# Patient Record
Sex: FEMALE | Race: WHITE | NOT HISPANIC OR LATINO | Employment: FULL TIME | URBAN - NONMETROPOLITAN AREA
[De-identification: names, ages, dates, MRNs, and addresses within clinical notes are randomized per-mention and may not be internally consistent; named-entity substitution may affect disease eponyms.]

---

## 2017-01-11 ENCOUNTER — TRANSCRIBE ORDERS (OUTPATIENT)
Dept: PHYSICAL THERAPY | Facility: HOSPITAL | Age: 47
End: 2017-01-11

## 2017-01-11 DIAGNOSIS — G89.29 CHRONIC RIGHT SHOULDER PAIN: Primary | ICD-10-CM

## 2017-01-11 DIAGNOSIS — M25.511 CHRONIC RIGHT SHOULDER PAIN: Primary | ICD-10-CM

## 2017-01-23 ENCOUNTER — CONSULT (OUTPATIENT)
Dept: GASTROENTEROLOGY | Facility: CLINIC | Age: 47
End: 2017-01-23

## 2017-01-23 ENCOUNTER — HOSPITAL ENCOUNTER (OUTPATIENT)
Dept: PHYSICAL THERAPY | Facility: HOSPITAL | Age: 47
Setting detail: THERAPIES SERIES
Discharge: HOME | End: 2017-01-23

## 2017-01-23 VITALS
SYSTOLIC BLOOD PRESSURE: 138 MMHG | HEIGHT: 59 IN | BODY MASS INDEX: 39.8 KG/M2 | WEIGHT: 197.4 LBS | OXYGEN SATURATION: 98 % | DIASTOLIC BLOOD PRESSURE: 80 MMHG | HEART RATE: 91 BPM

## 2017-01-23 DIAGNOSIS — R10.13 EPIGASTRIC PAIN: Primary | ICD-10-CM

## 2017-01-23 DIAGNOSIS — Z87.11 HISTORY OF STOMACH ULCERS: ICD-10-CM

## 2017-01-23 DIAGNOSIS — G89.29 CHRONIC RIGHT SHOULDER PAIN: Primary | ICD-10-CM

## 2017-01-23 DIAGNOSIS — M25.511 CHRONIC RIGHT SHOULDER PAIN: Primary | ICD-10-CM

## 2017-01-23 DIAGNOSIS — Z86.19 HISTORY OF HELICOBACTER PYLORI INFECTION: ICD-10-CM

## 2017-01-23 PROCEDURE — 97163 PT EVAL HIGH COMPLEX 45 MIN: CPT | Performed by: PHYSICAL THERAPIST

## 2017-01-23 PROCEDURE — 99244 OFF/OP CNSLTJ NEW/EST MOD 40: CPT | Performed by: PHYSICIAN ASSISTANT

## 2017-01-23 RX ORDER — GABAPENTIN 400 MG/1
400 CAPSULE ORAL 3 TIMES DAILY
COMMUNITY

## 2017-01-23 NOTE — MR AVS SNAPSHOT
Albertina A Bayron   1/23/2017 2:30 PM   Consult    Dept Phone:  [phone]   Encounter #:  [id number]    Provider:  SNOW Montalvo   Department:  [facility] GASTROENTEROLOGY                Your Full Care Plan              Today's Medication Changes          These changes are accurate as of: 1/23/17  3:12 PM.  If you have any questions, ask your nurse or doctor.               Stop taking medication(s)listed here:     guaiFENesin 600 MG 12 hr tablet   Commonly known as:  MUCINEX   Stopped by:  SNOW Montalvo           hydrOXYzine 25 MG capsule   Commonly known as:  VISTARIL   Stopped by:  SNOW Montalvo           lamoTRIgine 25 MG tablet   Commonly known as:  LaMICtal   Stopped by:  SNOW Montalvo           losartan-hydrochlorothiazide 100-25 MG per tablet   Commonly known as:  HYZAAR   Stopped by:  SNOW Montalvo           metFORMIN 500 MG tablet   Commonly known as:  GLUCOPHAGE   Stopped by:  SNOW Montalvo           sertraline 100 MG tablet   Commonly known as:  ZOLOFT   Stopped by:  SNOW Montalvo                      Your Updated Medication List          This list is accurate as of: 1/23/17  3:12 PM.  Always use your most recent med list.                amLODIPine 5 MG tablet   Commonly known as:  NORVASC       gabapentin 400 MG capsule   Commonly known as:  NEURONTIN       pantoprazole 40 MG EC tablet   Commonly known as:  PROTONIX               We Performed the Following     Case request       You Were Diagnosed With        Codes Comments    Epigastric pain    -  Primary ICD-10-CM: R10.13  ICD-9-CM: 789.06     History of stomach ulcers     ICD-10-CM: Z87.19  ICD-9-CM: V12.79     History of Helicobacter pylori infection     ICD-10-CM: Z86.19  ICD-9-CM: V12.09       Instructions     None    Patient Instructions History      Upcoming Appointments     Visit Type Date Time  Department    INITIAL EVALUATION 2017  9:00 AM [facility]    CONSULT 2017  2:30 PM [facility] [place]    TREATMENT 2017  3:00 PM [facility]    TREATMENT 2017  2:00 PM [facility]    TREATMENT 2017  2:00 PM [facility]    TREATMENT 2017  2:00 PM [facility]    TREATMENT 2017  2:00 PM [facility]    TREATMENT 2017  2:00 PM [facility]    TREATMENT 2017  2:00 PM [facility]    RE-EVALUATION 2017  2:00 PM [facility]      [employer] Signup     [facility] [employer] allows you to send messages to your doctor, view your test results, renew your prescriptions, schedule appointments, and more. To sign up, go to [employer] and click on the Sign Up Now link in the New User? box. Enter your [employer] Activation Code exactly as it appears below along with the last four digits of your Social Security Number and your Date of Birth () to complete the sign-up process. If you do not sign up before the expiration date, you must request a new code.    [employer] Activation Code: [email or web address]  Expires: 2017  3:12 PM    If you have questions, you can email [email or web address] or call [phone] to talk to our [employer] staff. Remember, [employer] is NOT to be used for urgent needs. For medical emergencies, dial 911.               Other Info from Your Visit           Your Appointments     2017  3:00 PM EST   Therapy Treatment with Sheron Bradley, [facility] OUTPATIENT PHYSICAL THERAPY ([place])    [address]  [place] [address]   [phone]            2017  2:00 PM EST   Therapy Treatment with Dora Song [facility] [place] OUTPATIENT PHYSICAL THERAPY ([place])    [address]  [place] [address]   [phone]            2017  2:00 PM EST   Therapy Treatment with Dora Song,  "[facility] OUTPATIENT PHYSICAL THERAPY ([place])    [address]  [place] [address]   [phone]            Feb 06, 2017  2:00 PM EST   Therapy Treatment with Dora Song, [facility] OUTPATIENT PHYSICAL THERAPY ([place])    [address]  [place] [address]   [phone]            Feb 09, 2017  2:00 PM EST   Therapy Treatment with Dora Song, [facility] OUTPATIENT PHYSICAL THERAPY ([place])    [address]  [place] [address]   [phone]              Allergies     Erythromycin      Penicillins      Sulfa Antibiotics        Reason for Visit     Abdominal Pain     Nausea           Vital Signs     Blood Pressure Pulse Height Weight Oxygen Saturation Body Mass Index    138/80 91 59\" (149.9 cm) 197 lb 6.4 oz (89.5 kg) 98% 39.87 kg/m2    Smoking Status                   Current Every Day Smoker           Problems and Diagnoses Noted     Abdominal pain, pit of stomach    Personal history of Helicobacter infection    History of stomach ulcers        "

## 2017-01-23 NOTE — PROGRESS NOTES
Chief Complaint   Patient presents with   • Abdominal Pain   • Nausea     Albertina Verma is a 46 y.o. female who presents to the office today at the request of TIMOTEO Rasheed for Abdominal Pain and Nausea.    HPI    The patient reports that she has a history of gastric ulcers and H. Pylori but has not had any trouble since 2007 until recently.  During that year, she also had gallstones in her common bile duct.  Her symptoms of nausea, vomiting, epigastric pain that she characterizes as an aching pain have returned.  Patient denies constipation, diarrhea, hematochezia, and melena.  She has been taking Protonix 40 mg for almost a month and it has helped her symptoms somewhat.  Even with the Protonix, the achy feeling returns in the evenings.  She takes zofran to enable her to eat.  Family history includes her mother dying from a perforated gastric ulcer.  Patient's medical history includes asthma, DM, hypertension, and nausea.  Surgical history includes hysterectomy, bronchoscopy, cholecystectomy, tonsillectomy, dental procedure, and cardiac catheterization.  She admits to smoking .5 PPD and admits past alcohol abuse.  Patient has been sober for 3 months.  She denies use of illicit drugs.      Review of Systems   Constitutional: Positive for chills and fever.   Eyes: Positive for visual disturbance.   Respiratory: Positive for cough, shortness of breath and wheezing.    Cardiovascular: Positive for chest pain.   Gastrointestinal: Positive for abdominal distention, abdominal pain and nausea. Negative for anal bleeding, blood in stool, constipation, diarrhea, rectal pain and vomiting.   Endocrine: Positive for cold intolerance.   Genitourinary: Negative.    Musculoskeletal: Positive for arthralgias, back pain, myalgias and neck pain.   Skin: Negative.    Allergic/Immunologic: Positive for environmental allergies.   Neurological: Positive for dizziness, syncope, light-headedness and headaches.   Hematological:  "Bruises/bleeds easily.   Psychiatric/Behavioral: Positive for sleep disturbance. The patient is nervous/anxious.        ACTIVE PROBLEMS:   Specialty Problems     None          PAST MEDICAL HISTORY:  Past Medical History   Diagnosis Date   • Abdominal pain    • Asthma    • Diabetes mellitus    • Hypertension    • Nausea        SURGICAL HISTORY:  Past Surgical History   Procedure Laterality Date   • Hysterectomy       2006   • Bronchoscopy     • Cholecystectomy     • Tonsillectomy     • Dental procedure     • Cardiac catheterization         FAMILY HISTORY:  Family History   Problem Relation Age of Onset   • Breast cancer Maternal Grandmother        SOCIAL HISTORY:  Social History   Substance Use Topics   • Smoking status: Current Every Day Smoker     Packs/day: 0.50     Types: Cigarettes   • Smokeless tobacco: Not on file   • Alcohol use No       CURRENT MEDICATION:    Current Outpatient Prescriptions:   •  amLODIPine (NORVASC) 5 MG tablet, Take 5 mg by mouth Daily., Disp: , Rfl:   •  gabapentin (NEURONTIN) 400 MG capsule, Take 400 mg by mouth 3 (Three) Times a Day., Disp: , Rfl:   •  pantoprazole (PROTONIX) 40 MG EC tablet, Take 40 mg by mouth Daily., Disp: , Rfl:     ALLERGIES:  Erythromycin; Penicillins; and Sulfa antibiotics    VISIT VITALS:  Visit Vitals   • /80   • Pulse 91   • Ht 59\" (149.9 cm)   • Wt 197 lb 6.4 oz (89.5 kg)   • SpO2 98%   • BMI 39.87 kg/m2       PHYSICAL EXAMINATION:  Physical Exam   Constitutional: She is oriented to person, place, and time. She appears well-developed and well-nourished. No distress.   HENT:   Head: Normocephalic and atraumatic.   Right Ear: External ear normal.   Left Ear: External ear normal.   Nose: Nose normal.   Mouth/Throat: Oropharynx is clear and moist. No oropharyngeal exudate.   Eyes: Conjunctivae and EOM are normal. Pupils are equal, round, and reactive to light. Right eye exhibits no discharge. Left eye exhibits no discharge. No scleral icterus.   Neck: " Normal range of motion. Neck supple. No JVD present. No tracheal deviation present. No thyromegaly present.   Cardiovascular: Normal rate, regular rhythm, normal heart sounds and intact distal pulses.  Exam reveals no gallop and no friction rub.    No murmur heard.  Pulmonary/Chest: Effort normal and breath sounds normal. No stridor. No respiratory distress. She has no wheezes. She has no rales. She exhibits no tenderness.   Abdominal: Soft. Bowel sounds are normal. She exhibits distension. She exhibits no mass. There is no tenderness. There is no rebound and no guarding. No hernia.   Musculoskeletal: She exhibits no edema, tenderness or deformity.   Lymphadenopathy:     She has no cervical adenopathy.   Neurological: She is alert and oriented to person, place, and time. She has normal reflexes. She displays normal reflexes. No cranial nerve deficit. She exhibits normal muscle tone. Coordination normal.   Skin: Skin is warm and dry. No rash noted. She is not diaphoretic. No erythema. No pallor.   Psychiatric: She has a normal mood and affect. Her behavior is normal. Judgment and thought content normal.       Assessment/Plan      Diagnosis Plan   1. Epigastric pain  Case request   2. History of stomach ulcers  Case request   3. History of Helicobacter pylori infection  Case request     It is recommended that the patient have an EGD.  She voiced understanding and agreement.  Return in about 4 weeks (around 2/20/2017) for Recheck.           SNOW Montalvo

## 2017-01-23 NOTE — PROGRESS NOTES
Outpatient Physical Therapy Ortho Initial Evaluation   [place]     Patient Name: Albertina Verma  : 1970  MRN: [id number]  Today's Date: 2017      Visit Date: 2017    There is no problem list on file for this patient.       Past Medical History   Diagnosis Date   • Asthma    • Diabetes mellitus    • Hypertension         Past Surgical History   Procedure Laterality Date   • Hysterectomy       2006   • Bronchoscopy     • Cholecystectomy     • Tonsillectomy     • Dental procedure         Visit Dx:     ICD-10-CM ICD-9-CM   1. Chronic right shoulder pain M25.511 719.41    G89.29 338.29             Patient History       17 0800          History    Chief Complaint Pain  -AT      Type of Pain Shoulder pain;Neck pain  -AT      Date Current Problem(s) Began 16  -AT      Brief Description of Current Complaint Pt states she was admitted to hospital for pneumonia on .  She states she was in hospital for  4 days and states that when she went back home she noticed that her neck and shoulder pain were hurting.  She feels like she pulled something from possibly coughing however it has not gone away.  She is unsure of any particular injury. 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  Pt  was referred to orthopedic surgeon who stated she no longer needed to continue PT services and  gave her steroid injections in shoulder.  She also had a MRI of cervical spine that revealed C5-6 disc herniation.  She states she continues to experience pain and burning in neck, head, right shoulder and right ring finger burns frequently throughout the day.  She is now being referred back to physical therapy by her family dr and states she returns to Dr. Dubin after today's appointment for follow up.    -AT      Patient/Caregiver Goals Relieve pain;Return to prior level of function  -AT      Current Tobacco Use yes  -AT      Smoking Status   pack per day for 27 years  -AT      Patient's Rating of General Health Good  -AT      Occupation/sports/leisure activities  at [employer]  -AT      What clinical tests have you had for this problem? MRI  -AT      Results of Clinical Tests per pt: c-spine was straight and not curved, and C5-6 disc bulge ---no testing at this time on shoulder has been performed.  -AT      Are you or can you be pregnant No  -AT      Pain     Pain Location Neck;Shoulder  -AT      Pain at Present 8  -AT      Pain at Best 2  -AT      Pain at Worst 10  -AT      Pain Frequency Constant/continuous  -AT      Pain Description Aching;Burning;Tingling  -AT      What Performance Factors Make the Current Problem(s) WORSE? pt states work exacerbates it however she did not work this weekend and she continues to experience burning in ring finger.  She states she does not know what causes pain at this time because it hurts with each activity she does prolonged and fluctuates in intensity.   -AT      What Performance Factors Make the Current Problem(s) BETTER? rest and medication, laying down.  -AT      Is your sleep disturbed? Yes  -AT      Total hours of sleep per night 4-5  -AT      Difficulties at work? sitting, keying, repetitive use of right arm  -AT      Difficulties with ADL's? Pt states she is independent with dressing and bathing however states she has difficulty fastening a bra.  She states her son has to assist with carrying laundry up the stairs .  -AT      Daily Activities    Primary Language English  -AT      Are you able to read Yes  -AT      Are you able to write Yes  -AT      How does patient learn best? Listening  -AT      Teaching needs identified Home Exercise Program  -AT      Pt Participated in POC and Goals Yes  -AT      Safety    Are you being hurt, hit, or frightened by anyone at home or in your life? No  -AT      Are you being neglected by a caregiver No  -AT        User Key  (r) = Recorded By, (t) = Taken By, (c) =  Cosigned By    Initials Name Provider Type    AT Felicita Billy PT Physical Therapist                PT Ortho       01/23/17 0900    Posture/Observations    Forward Head Moderate  -AT    Cervical Lordosis Decreased  -AT    Rounded Shoulders Moderate  -AT    Sensation    Sensation WNL? WNL   however burning right ring finert (tip)  -AT    Myotomal Screen- Upper Quarter Clearing    Shoulder flexion (C5) Right:;4- (Good -);Left:;4 (Good)  -AT    Elbow flexion/wrist extension (C6) Right:;4 (Good);Left:;4+ (Good +)  -AT    Elbow extension/wrist flexion (C7) Right:;4 (Good);Left:;4+ (Good +)  -AT    Cervical Palpation    Scalenes Tender  -AT    Spinous Process Tender  -AT    Levator Scapula Tender  -AT    Upper Traps Right:;Tender  -AT    Cervical/Thoracic Special Tests    Spurlings (Foraminal Compression) Positive  -AT    Cervical Compression (Forarminal Compression vs. Facet Pain) Negative  -AT    Cervical Distraction (Foraminal Compression vs. Facet Pain) Positive   relieves pain with distraction  -AT    Shoulder Girdle Palpation    Supraspinatus Insertion Right:;Tender  -AT    Upper Trap Right:;Tender  -AT    Shoulder Impingement/Rotator Cuff Special Tests    Chavez-Obed Test (RC Lesion vs. Bursitis) --   increases pain  -AT    Full Can Test (RC Lesion) Negative  -AT    Empty Can Test (RC Lesion) --   increases pain with resistance   -AT    Drop Arm Test (Full Thickness RC Lesion) Negative  -AT    Speed's Test (LH of Biceps Lesion) --   increases pain with resistance  -AT    Biceps/Labral Special Tests    Milford's Test (Labral Test) --   increases pain with resistance  -AT    Resisted ER and Supination Negative  -AT    Neck    Flexion AROM Deficit 50  -AT    Extension AROM Deficit 50  -AT    Lt Lat Flexion AROM Deficit 50  -AT    Rt Lateral Flexion AROM Deficit 45  -AT    Lt Rotation AROM Deficit 80  -AT    Rt Rotation AROM Deficit 40  -AT    Left Shoulder    Flexion AROM Deficit WFL  -AT    ABduction  AROM Deficit WFL  -AT    Right Shoulder    Flexion AROM Deficit 150  -AT    ABduction AROM Deficit 130  -AT      User Key  (r) = Recorded By, (t) = Taken By, (c) = Cosigned By    Initials Name Provider Type    AT Felicita Billy PT Physical Therapist                            Therapy Education       01/23/17 0950    Therapy Education    Given HEP  -AT    Program New  -AT    How Provided Verbal  -AT      User Key  (r) = Recorded By, (t) = Taken By, (c) = Cosigned By    Initials Name Provider Type    AT Felicita Billy PT Physical Therapist                PT OP Goals       01/23/17 0900          PT Short Term Goals    STG 1 Pt will be instructed in a HEP for shoulder ROM and stability.  -AT      STG 2 Pt will improve shoulder ROM by 5 degrees in all planes to ease ADLs and work activities.   -AT      STG 3 Pt will report a decrease in burning in right UE by 50%.  -AT      Long Term Goals    LTG 1 Pt will be independent with HEP for scapular/shoulder strength and ROm.   -AT      LTG 2 Pt will demonstrate normal right shoulder ROM to ease work and home activities.   -AT      LTG 3 Pt will report being able to perform work day with controlled pain.   -AT      LTG 4 Pt will report an improvement in Quick Dash Score by 10 points to reveal an improvement in daily activities.    -AT      Time Calculation    PT Goal Re-Cert Due Date 02/22/17  -AT        User Key  (r) = Recorded By, (t) = Taken By, (c) = Cosigned By    Initials Name Provider Type    AT Felicita Billy, PT Physical Therapist                PT Assessment/Plan       01/23/17 0951          PT Assessment    Functional Limitations Performance in self-care ADL;Performance in work activities;Limitations in community activities  -AT      Impairments Range of motion;Posture;Poor body mechanics;Pain;Muscle strength  -AT      Assessment Comments Pt is a 45 year old female referred for chronic shoulder pain.  She states she began to have shoulder  and neck pain after pneumonia in September.  She has been referred to orthopedic who performed injections however she reports continued pain.  She had an MRI that revealed C5-6 disc bulge however no testing has been performed at this time on her shoulder.  She presents with decreased shoulder ROM, strength, ADLs, pain, burning right ring finger, and decreased work activities.  Pt will benefit from skilled PT services to address limitations and reach max functional level.    -AT      Rehab Potential Good  -AT      Patient/caregiver participated in establishment of treatment plan and goals Yes  -AT      Patient would benefit from skilled therapy intervention Yes  -AT      PT Plan    PT Frequency 2x/week;3x/week  -AT      Predicted Duration of Therapy Intervention (days/wks) 4 weeks  -AT      Planned CPT's? PT EVAL: 85262;PT THER PROC EA 15 MIN: 36634;PT THER ACT EA 15 MIN: 57517;PT MANUAL THERAPY EA 15 MIN: 96825;PT NEUROMUSC RE-EDUCATION EA 15 MIN: 69010;PT ELECTRICAL STIM UNATTEND: ;PT ULTRASOUND EA 15 MIN: 65409;PT HOT/COLD PACK WC NONMCARE: 53024  -AT      Physical Therapy Interventions (Optional Details) neuromuscular re-education;stretching;strengthening;modalities;manual therapy techniques;ROM (Range of Motion);postural re-education;patient/family education;home exercise program;joint mobilization  -AT      PT Plan Comments Pt will benefit from skilled PT services to address limitations and reach max functional level.    -AT        User Key  (r) = Recorded By, (t) = Taken By, (c) = Cosigned By    Initials Name Provider Type    AT Felicita Billy, PT Physical Therapist                                    Outcome Measures       01/23/17 0900          Quick DASH    Open a tight or new jar. 3  -AT      Do heavy household chores (e.g., wash walls, wash floors) 3  -AT      Carry a shopping bag or briefcase 2  -AT      Wash your back 2  -AT      Use a knife to cut food 1  -AT      Recreational activities  in which you take some force or impact through your arm, should or hand (e.g. golf, hammering, tennis, etc.) 4  -AT      During the past week, to what extent has your arm, shoulder, or hand problem interfered with your normal social activites with family, friends, neighbors or groups? 3  -AT      During the past week, were you limited in your work or other regular daily activities as a result of your arm, shoulder or hand problem? 4  -AT      Arm, Shoulder, or hand pain 4  -AT      Tingling (pins and needles) in your arm, shoulder, or hand 4  -AT      During the past week, how much difficulty have you had sleeping because of the pain in your arm, shoulder or hand? 5  -AT      Number of Questions Answered 11  -AT      Quick DASH Score 54.55  -AT      Functional Assessment    Outcome Measure Options Quick DASH  -AT        User Key  (r) = Recorded By, (t) = Taken By, (c) = Cosigned By    Initials Name Provider Type    AT Felicita Billy PT Physical Therapist            Time Calculation:   Start Time: 0830  Stop Time: 0930  Time Calculation (min): 60 min     Therapy Charges for Today     Code Description Service Date Service Provider Modifiers Qty    [id number] HC PT EVAL HIGH COMPLEXITY 4 1/23/2017 Felicita Billy, PT GP 1          PT G-Codes  Outcome Measure Options: Quick DASH         Felicita Billy PT  1/23/2017

## 2017-01-26 ENCOUNTER — HOSPITAL ENCOUNTER (OUTPATIENT)
Dept: PHYSICAL THERAPY | Facility: HOSPITAL | Age: 47
Setting detail: THERAPIES SERIES
Discharge: HOME | End: 2017-01-26

## 2017-01-26 DIAGNOSIS — G89.29 CHRONIC RIGHT SHOULDER PAIN: Primary | ICD-10-CM

## 2017-01-26 DIAGNOSIS — M25.511 CHRONIC RIGHT SHOULDER PAIN: Primary | ICD-10-CM

## 2017-01-26 PROCEDURE — G0283 ELEC STIM OTHER THAN WOUND: HCPCS

## 2017-01-26 PROCEDURE — 97035 APP MDLTY 1+ULTRASOUND EA 15: CPT

## 2017-01-26 PROCEDURE — 97110 THERAPEUTIC EXERCISES: CPT

## 2017-01-26 NOTE — PROGRESS NOTES
Outpatient Physical Therapy Ortho Treatment Note   Pete     Patient Name: Albertina Verma  : 1970  MRN: [id number]  Today's Date: 2017      Visit Date: 2017    Visit Dx:    ICD-10-CM ICD-9-CM   1. Chronic right shoulder pain M25.511 719.41    G89.29 338.29       Patient Active Problem List   Diagnosis   • Epigastric pain   • History of stomach ulcers   • History of Helicobacter pylori infection        Past Medical History   Diagnosis Date   • Abdominal pain    • Asthma    • Diabetes mellitus    • Hypertension    • Nausea         Past Surgical History   Procedure Laterality Date   • Hysterectomy          • Bronchoscopy     • Cholecystectomy     • Tonsillectomy     • Dental procedure     • Cardiac catheterization               PT Ortho       17 1600    Subjective Comments    Subjective Comments Patient states that she has tingling and burning that runs down his R arm. Patient reports that the burning runs down into her R ring finger.   -    Subjective Pain    Able to rate subjective pain? yes  -    Pre-Treatment Pain Level 7  -    Post-Treatment Pain Level 6  -      User Key  (r) = Recorded By, (t) = Taken By, (c) = Cosigned By    Initials Name Provider Type    SUMAYA Bradley PTA Physical Therapy Assistant                            PT Assessment/Plan       17 1617 17 0951       PT Assessment    Functional Limitations  Performance in self-care ADL;Performance in work activities;Limitations in community activities  -AT     Impairments  Range of motion;Posture;Poor body mechanics;Pain;Muscle strength  -AT     Assessment Comments Patient tolerated treatment well with no increase in pain noted during treatment, patient did have burning and tingling sensation run down her R arm and into her R ring finger. New ther ex added per the patient's tolerance with no increase in pain noted. Educated patient to perform ther ex per her tolerance, patient verbalized  understanding. No adverse reactions with modalities or treatment. Decrease in pain noted following treatment.   - Pt is a 45 year old female referred for chronic shoulder pain.  She states she began to have shoulder and neck pain after pneumonia in September.  She has been referred to orthopedic who performed injections however she reports continued pain.  She had an MRI that revealed C5-6 disc bulge however no testing has been performed at this time on her shoulder.  She presents with decreased shoulder ROM, strength, ADLs, pain, burning right ring finger, and decreased work activities.  Pt will benefit from skilled PT services to address limitations and reach max functional level.    -AT     Rehab Potential  Good  -AT     Patient/caregiver participated in establishment of treatment plan and goals  Yes  -AT     Patient would benefit from skilled therapy intervention  Yes  -AT     PT Plan    PT Frequency  2x/week;3x/week  -AT     Predicted Duration of Therapy Intervention (days/wks)  4 weeks  -AT     Planned CPT's?  PT EVAL: 16568;PT THER PROC EA 15 MIN: 95468;PT THER ACT EA 15 MIN: 71196;PT MANUAL THERAPY EA 15 MIN: 12389;PT NEUROMUSC RE-EDUCATION EA 15 MIN: 89833;PT ELECTRICAL STIM UNATTEND: ;PT ULTRASOUND EA 15 MIN: 83925;PT HOT/COLD PACK WC NONMCARE: 65218  -AT     Physical Therapy Interventions (Optional Details)  neuromuscular re-education;stretching;strengthening;modalities;manual therapy techniques;ROM (Range of Motion);postural re-education;patient/family education;home exercise program;joint mobilization  -AT     PT Plan Comments Continue per PT's POC, progress per the patient's tolerance  - Pt will benefit from skilled PT services to address limitations and reach max functional level.    -AT       User Key  (r) = Recorded By, (t) = Taken By, (c) = Cosigned By    Initials Name Provider Type    AT Felicita Billy, PT Physical Therapist     Sheron Bradley, PTA Physical Therapy  Assistant                Modalities       01/26/17 1600          Moist Heat    MH Applied Yes   No redness noted following moist heat  -AH      Location R shoulder/UT  -AH      Rx Minutes 10 mins  -      MH Prior to Rx Yes  -      ELECTRICAL STIMULATION    Attended/Unattended Unattended   No irritation noted following estim  -      Stimulation Type IFC  -AH      Max mAmp --   per the patient's tolerance  -      Location/Electrode Placement/Other R shoulder/UT  -AH      Rx Minutes 10 mins  -      Ultrasound 35631    Location R UT musculature  -AH      Rx Minutes 8 minutes  -      Duty Cycle 50  -      Frequency --   3.3MHz  -      Intensity - Wts/cm 1.2  -        User Key  (r) = Recorded By, (t) = Taken By, (c) = Cosigned By    Initials Name Provider Type    SUMAYA Bradley PTA Physical Therapy Assistant                Exercises       01/26/17 1600          Subjective Comments    Subjective Comments Patient states that she has tingling and burning that runs down his R arm. Patient reports that the burning runs down into her R ring finger.   -      Subjective Pain    Able to rate subjective pain? yes  -      Pre-Treatment Pain Level 7  -      Post-Treatment Pain Level 6  -      Exercise 1    Exercise Name 1 table slides R) (flex, scap) 10x2, pulleys (flex) 2 min, UT stretch 20 sec hold x3, levator scap stretch 20 sec hold x3, CT stretch 20 sec hold x3, scap retraction 10x2  -      Cueing 1 Verbal;Tactile;Demo  -      Time (Minutes) 1 30 minutes  -      Treatment Type 1 Ther Ex  -        User Key  (r) = Recorded By, (t) = Taken By, (c) = Cosigned By    Initials Name Provider Type    SUMAYA Bradley PTA Physical Therapy Assistant                               PT OP Goals       01/23/17 0900          PT Short Term Goals    STG 1 Pt will be instructed in a HEP for shoulder ROM and stability.  -AT      STG 2 Pt will improve shoulder ROM by 5 degrees in all planes to ease  ADLs and work activities.   -AT      STG 3 Pt will report a decrease in burning in right UE by 50%.  -AT      Long Term Goals    LTG 1 Pt will be independent with HEP for scapular/shoulder strength and ROm.   -AT      LTG 2 Pt will demonstrate normal right shoulder ROM to ease work and home activities.   -AT      LTG 3 Pt will report being able to perform work day with controlled pain.   -AT      LTG 4 Pt will report an improvement in Quick Dash Score by 10 points to reveal an improvement in daily activities.    -AT      Time Calculation    PT Goal Re-Cert Due Date 02/22/17  -AT        User Key  (r) = Recorded By, (t) = Taken By, (c) = Cosigned By    Initials Name Provider Type    AT Felicita Billy PT Physical Therapist                Therapy Education       01/26/17 1617    Therapy Education    Given HEP  -AH    Program New  -    How Provided Verbal  -AH    Provided to Patient  -AH    Level of Understanding Verbalized;Demonstrated  -AH      01/23/17 0950    Therapy Education    Given HEP  -AT    Program New  -    How Provided Verbal  -AT      User Key  (r) = Recorded By, (t) = Taken By, (c) = Cosigned By    Initials Name Provider Type    AT Felicita Billy, PT Physical Therapist     Sheron Bradley, MACIEJ Physical Therapy Assistant                Time Calculation:   Start Time: 1500  Stop Time: 1600  Time Calculation (min): 60 min    Therapy Charges for Today     Code Description Service Date Service Provider Modifiers Qty    30194290175 HC PT THER PROC EA 15 MIN 1/26/2017 Sheron Bradley, MACIEJ GP 2    99890151047 HC ELECTRICAL STIM UNATTENDED 1/26/2017 Sheron Bradley PTA  1    97268313092 HC PT ULTRASOUND EA 15 MIN 1/26/2017 Sheron Bradley, PTA GP 1    43258748139 HC PT THER SUPP EA 15 MIN 1/26/2017 Sheron Bradley, MACIEJ GP 1                    Sheron Bradley PTA  1/26/2017

## 2017-02-02 ENCOUNTER — HOSPITAL ENCOUNTER (OUTPATIENT)
Dept: PHYSICAL THERAPY | Facility: HOSPITAL | Age: 47
Setting detail: THERAPIES SERIES
Discharge: HOME | End: 2017-02-02

## 2017-02-02 DIAGNOSIS — M25.511 CHRONIC RIGHT SHOULDER PAIN: Primary | ICD-10-CM

## 2017-02-02 DIAGNOSIS — G89.29 CHRONIC RIGHT SHOULDER PAIN: Primary | ICD-10-CM

## 2017-02-02 PROCEDURE — 97035 APP MDLTY 1+ULTRASOUND EA 15: CPT

## 2017-02-02 PROCEDURE — 97110 THERAPEUTIC EXERCISES: CPT

## 2017-02-02 PROCEDURE — G0283 ELEC STIM OTHER THAN WOUND: HCPCS

## 2017-02-02 NOTE — PROGRESS NOTES
Outpatient Physical Therapy Ortho Treatment Note   Pete     Patient Name: Albertina Verma  : 1970  MRN: [id number]  Today's Date: 2017      Visit Date: 2017    Visit Dx:    ICD-10-CM ICD-9-CM   1. Chronic right shoulder pain M25.511 719.41    G89.29 338.29       Patient Active Problem List   Diagnosis   • Epigastric pain   • History of stomach ulcers   • History of Helicobacter pylori infection        Past Medical History   Diagnosis Date   • Abdominal pain    • Asthma    • Diabetes mellitus    • Hypertension    • Nausea         Past Surgical History   Procedure Laterality Date   • Hysterectomy          • Bronchoscopy     • Cholecystectomy     • Tonsillectomy     • Dental procedure     • Cardiac catheterization               PT Ortho       17 1600    Subjective Comments    Subjective Comments Patient presents to therapy with reports of increased pain, 8/10 pre tx.  Patient states she has been unable to take her ibuprofen which she believes was helping w/ her pain.  Pt also reports increased soreness following previous session. Pt states she has currently been put off work by referring MD.   -SHAYLEE    Subjective Pain    Able to rate subjective pain? yes  -SHAYLEE    Pre-Treatment Pain Level 8  -SHAYLEE    Post-Treatment Pain Level 5  -SHAYLEE      User Key  (r) = Recorded By, (t) = Taken By, (c) = Cosigned By    Initials Name Provider Type    SHAYLEE Song PTA Physical Therapy Assistant                            PT Assessment/Plan       17 1653          PT Assessment    Assessment Comments Patient presents to therapy w/ reports of increased R) shoulder/ neck pain.  Pt received modalities to assist w/ pain control f/b conservative therex for improved range of motion to involved areas and postural awareness, as to pt's tolerance.  Pt educated to monitor skin following application of biofreeze w/ pt verbalizing understanding.  No skin irritation or adverse reactions observed following  "modalities.  Discussed pt's symptoms with supervising PT, Felicita Billy prior to initiation of tx.  PT advised PTA on today's tx.  Decreased pain noted following session, 5/10.  -SHAYLEE      PT Plan    PT Plan Comments Continue with PT's POC and progress tx as tolerated by patient.  Will continue to monitor pt's symptoms.   -SHAYLEE        User Key  (r) = Recorded By, (t) = Taken By, (c) = Cosigned By    Initials Name Provider Type    SHAYLEE Song PTA Physical Therapy Assistant                Modalities       02/02/17 1600          Moist Heat    MH Applied Yes   applied by Sheron Bradley PTA  -SHAYLEE      Location R shoulder/UT  -SHAYLEE      Rx Minutes 15 mins  -SHAYLEE      MH Prior to Rx Yes  -SHAYLEE      ELECTRICAL STIMULATION    Attended/Unattended Unattended  -SHAYLEE      Stimulation Type IFC  -SHAYLEE      Max mAmp --   as to pt's tolerance  -SHAYLEE      Location/Electrode Placement/Other R shoulder/UT  -SHAYLEE      Rx Minutes 15 mins  -SHAYLEE      Ultrasound 48216    Location R) UT musculature  -SHAYLEE      Rx Minutes 8 minutes  -SHAYLEE      Duty Cycle 100  -SHAYLEE      Frequency --   3.3MHz  -SHAYLEE      Intensity - Wts/cm 1.2  -SHAYLEE        User Key  (r) = Recorded By, (t) = Taken By, (c) = Cosigned By    Initials Name Provider Type    SHAYLEE Song PTA Physical Therapy Assistant                Exercises       02/02/17 1600          Subjective Comments    Subjective Comments Patient presents to therapy with reports of increased pain, 8/10 pre tx.  Patient states she has been unable to take her ibuprofen which she believes was helping w/ her pain.  Pt also reports increased soreness following previous session. Pt states she has currently been put off work by referring MD.   -SHAYLEE      Subjective Pain    Able to rate subjective pain? yes  -SHAYLEE      Pre-Treatment Pain Level 8  -SHAYLEE      Post-Treatment Pain Level 5  -SHAYLEE      Exercise 1    Exercise Name 1 table slides (flex) 10x2, shoulder pulleys (flex) 2 min, UT stretch 3x20\", Lev scap stretch 3x20\", " "C-T stretch 3x20\", scap squeeze 10x2, chin tuck (supine) x10  -SHAYLEE      Cueing 1 Verbal;Tactile;Demo  -SHAYLEE      Time (Minutes) 1 30 min  -SHAYLEE      Treatment Type 1 Ther Ex  -SHAYLEE        User Key  (r) = Recorded By, (t) = Taken By, (c) = Cosigned By    Initials Name Provider Type    SHAYLEE Song PTA Physical Therapy Assistant                               PT OP Goals       01/23/17 0900          PT Short Term Goals    STG 1 Pt will be instructed in a HEP for shoulder ROM and stability.  -AT      STG 2 Pt will improve shoulder ROM by 5 degrees in all planes to ease ADLs and work activities.   -AT      STG 3 Pt will report a decrease in burning in right UE by 50%.  -AT      Long Term Goals    LTG 1 Pt will be independent with HEP for scapular/shoulder strength and ROm.   -AT      LTG 2 Pt will demonstrate normal right shoulder ROM to ease work and home activities.   -AT      LTG 3 Pt will report being able to perform work day with controlled pain.   -AT      LTG 4 Pt will report an improvement in Quick Dash Score by 10 points to reveal an improvement in daily activities.    -AT      Time Calculation    PT Goal Re-Cert Due Date 02/22/17  -AT        User Key  (r) = Recorded By, (t) = Taken By, (c) = Cosigned By    Initials Name Provider Type    AT Felicita Billy, PT Physical Therapist                Therapy Education       02/02/17 1653    Therapy Education    Given HEP;Pain management;Posture/body mechanics  -SHAYLEE    Program Reinforced  -SHAYLEE    How Provided Verbal;Demonstration  -SHAYLEE    Provided to Patient  -SHAYLEE    Level of Understanding Verbalized;Demonstrated  -SHAYLEE      User Key  (r) = Recorded By, (t) = Taken By, (c) = Cosigned By    Initials Name Provider Type    SHAYLEE Song PTA Physical Therapy Assistant                Time Calculation:   Start Time: 1350  Stop Time: 1448  Time Calculation (min): 58 min    Therapy Charges for Today     Code Description Service Date Service Provider " Modifiers Qty    47161414192 HC ELECTRICAL STIM UNATTENDED 2/2/2017 Dora Song, PTA  1    31417215721 HC PT THER PROC EA 15 MIN 2/2/2017 Dora Song, PTA GP 2    38094450953 HC PT ULTRASOUND EA 15 MIN 2/2/2017 Dora Song, PTA GP 1    92894671138 HC PT THER SUPP EA 15 MIN 2/2/2017 Dora Song, MACIEJ GP 1                    Dora Wayne. Duncan, MACIEJ  2/2/2017

## 2017-02-03 RX ORDER — IBUPROFEN 800 MG/1
800 TABLET ORAL EVERY 6 HOURS PRN
COMMUNITY

## 2017-02-03 RX ORDER — CYCLOBENZAPRINE HCL 10 MG
10 TABLET ORAL AS NEEDED
COMMUNITY

## 2017-02-06 ENCOUNTER — HOSPITAL ENCOUNTER (OUTPATIENT)
Facility: HOSPITAL | Age: 47
Setting detail: HOSPITAL OUTPATIENT SURGERY
Discharge: HOME | End: 2017-02-06
Attending: INTERNAL MEDICINE | Admitting: INTERNAL MEDICINE

## 2017-02-06 ENCOUNTER — ANESTHESIA (OUTPATIENT)
Dept: PERIOP | Facility: HOSPITAL | Age: 47
End: 2017-02-06

## 2017-02-06 ENCOUNTER — ANESTHESIA EVENT (OUTPATIENT)
Dept: PERIOP | Facility: HOSPITAL | Age: 47
End: 2017-02-06

## 2017-02-06 VITALS
DIASTOLIC BLOOD PRESSURE: 67 MMHG | RESPIRATION RATE: 20 BRPM | SYSTOLIC BLOOD PRESSURE: 106 MMHG | HEIGHT: 59 IN | OXYGEN SATURATION: 95 % | HEART RATE: 66 BPM | WEIGHT: 195 LBS | BODY MASS INDEX: 39.31 KG/M2 | TEMPERATURE: 97.4 F

## 2017-02-06 DIAGNOSIS — R10.13 EPIGASTRIC PAIN: ICD-10-CM

## 2017-02-06 DIAGNOSIS — Z86.19 HISTORY OF HELICOBACTER PYLORI INFECTION: ICD-10-CM

## 2017-02-06 DIAGNOSIS — Z87.11 HISTORY OF STOMACH ULCERS: ICD-10-CM

## 2017-02-06 PROCEDURE — 25010000002 FENTANYL CITRATE (PF) 100 MCG/2ML SOLUTION: Performed by: NURSE ANESTHETIST, CERTIFIED REGISTERED

## 2017-02-06 PROCEDURE — 43239 EGD BIOPSY SINGLE/MULTIPLE: CPT | Performed by: INTERNAL MEDICINE

## 2017-02-06 PROCEDURE — 25010000002 PROPOFOL 10 MG/ML EMULSION: Performed by: NURSE ANESTHETIST, CERTIFIED REGISTERED

## 2017-02-06 RX ORDER — FENTANYL CITRATE 50 UG/ML
50 INJECTION, SOLUTION INTRAMUSCULAR; INTRAVENOUS
Status: DISCONTINUED | OUTPATIENT
Start: 2017-02-06 | End: 2017-02-06 | Stop reason: HOSPADM

## 2017-02-06 RX ORDER — LIDOCAINE HYDROCHLORIDE 10 MG/ML
INJECTION, SOLUTION INFILTRATION; PERINEURAL AS NEEDED
Status: DISCONTINUED | OUTPATIENT
Start: 2017-02-06 | End: 2017-02-06 | Stop reason: SURG

## 2017-02-06 RX ORDER — IPRATROPIUM BROMIDE AND ALBUTEROL SULFATE 2.5; .5 MG/3ML; MG/3ML
3 SOLUTION RESPIRATORY (INHALATION) ONCE AS NEEDED
Status: DISCONTINUED | OUTPATIENT
Start: 2017-02-06 | End: 2017-02-06 | Stop reason: HOSPADM

## 2017-02-06 RX ORDER — SODIUM CHLORIDE 0.9 % (FLUSH) 0.9 %
1-10 SYRINGE (ML) INJECTION AS NEEDED
Status: DISCONTINUED | OUTPATIENT
Start: 2017-02-06 | End: 2017-02-06 | Stop reason: HOSPADM

## 2017-02-06 RX ORDER — PROPOFOL 10 MG/ML
VIAL (ML) INTRAVENOUS CONTINUOUS PRN
Status: DISCONTINUED | OUTPATIENT
Start: 2017-02-06 | End: 2017-02-06 | Stop reason: SURG

## 2017-02-06 RX ORDER — FENTANYL CITRATE 50 UG/ML
INJECTION, SOLUTION INTRAMUSCULAR; INTRAVENOUS AS NEEDED
Status: DISCONTINUED | OUTPATIENT
Start: 2017-02-06 | End: 2017-02-06 | Stop reason: SURG

## 2017-02-06 RX ORDER — OXYCODONE HYDROCHLORIDE AND ACETAMINOPHEN 5; 325 MG/1; MG/1
1 TABLET ORAL ONCE AS NEEDED
Status: DISCONTINUED | OUTPATIENT
Start: 2017-02-06 | End: 2017-02-06 | Stop reason: HOSPADM

## 2017-02-06 RX ORDER — ONDANSETRON 2 MG/ML
4 INJECTION INTRAMUSCULAR; INTRAVENOUS ONCE AS NEEDED
Status: DISCONTINUED | OUTPATIENT
Start: 2017-02-06 | End: 2017-02-06 | Stop reason: HOSPADM

## 2017-02-06 RX ORDER — SODIUM CHLORIDE, SODIUM LACTATE, POTASSIUM CHLORIDE, CALCIUM CHLORIDE 600; 310; 30; 20 MG/100ML; MG/100ML; MG/100ML; MG/100ML
125 INJECTION, SOLUTION INTRAVENOUS CONTINUOUS
Status: DISCONTINUED | OUTPATIENT
Start: 2017-02-06 | End: 2017-02-06 | Stop reason: HOSPADM

## 2017-02-06 RX ORDER — MEPERIDINE HYDROCHLORIDE 25 MG/ML
12.5 INJECTION INTRAMUSCULAR; INTRAVENOUS; SUBCUTANEOUS
Status: DISCONTINUED | OUTPATIENT
Start: 2017-02-06 | End: 2017-02-06 | Stop reason: HOSPADM

## 2017-02-06 RX ADMIN — FENTANYL CITRATE 100 MCG: 50 INJECTION INTRAMUSCULAR; INTRAVENOUS at 10:54

## 2017-02-06 RX ADMIN — SODIUM CHLORIDE, POTASSIUM CHLORIDE, SODIUM LACTATE AND CALCIUM CHLORIDE: 600; 310; 30; 20 INJECTION, SOLUTION INTRAVENOUS at 10:55

## 2017-02-06 RX ADMIN — LIDOCAINE HYDROCHLORIDE 40 MG: 10 INJECTION, SOLUTION INFILTRATION; PERINEURAL at 10:54

## 2017-02-06 RX ADMIN — PROPOFOL 100 MCG/KG/MIN: 10 INJECTION, EMULSION INTRAVENOUS at 10:59

## 2017-02-06 NOTE — OP NOTE
02/06/17    ESOPHAGOGASTRODUODENOSCOPY WITH BIOPSY  Procedure Note    Albertina Verma  2/6/2017    Pre-op Diagnosis: Recurrent epigastric pain, nausea/vomiting, history of H. pylori infection, history of gastric ulcer      Post-op Diagnosis: Normal EGD        Anesthesia: Per Anesthesia service      Estimated Blood Loss: Negligible      Findings: EGD was performed with careful examination of the esophagus, stomach and duodenum to the fourth portion.  All areas appeared normal.  Biopsies were taken from the gastric antrum in order to check for H. pylori.  Duodenal biopsies were taken in order to screen for occult small bowel mucosal disease.      Complications: None      Recommendations:   Findings discussed with the patient  Await biopsy findings  Continue present treatment      Sukhi Talley III, MD     Date: 2/6/2017  Time: 11:04 AM     CC:  Katy MAHMOOD

## 2017-02-06 NOTE — PLAN OF CARE
Problem: Patient Care Overview (Adult)  Goal: Plan of Care Review  Outcome: Ongoing (interventions implemented as appropriate)    02/06/17 0842   Coping/Psychosocial Response Interventions   Plan Of Care Reviewed With patient   Patient Care Overview   Progress improving       Goal: Discharge Needs Assessment  Outcome: Ongoing (interventions implemented as appropriate)    02/06/17 0842   Discharge Needs Assessment   Concerns To Be Addressed no discharge needs identified   Readmission Within The Last 30 Days no previous admission in last 30 days   Discharge Disposition home or self-care   Living Environment   Transportation Available car         Problem: GI Endoscopy (Adult)  Goal: Signs and Symptoms of Listed Potential Problems Will be Absent or Manageable (GI Endoscopy)  Outcome: Ongoing (interventions implemented as appropriate)    02/06/17 0842   GI Endoscopy   Problems Assessed (GI Endoscopy) all   Problems Present (GI Endoscopy) none

## 2017-02-06 NOTE — ANESTHESIA POSTPROCEDURE EVALUATION
Patient: Albertina Verma    Procedure Summary     Date Anesthesia Start Anesthesia Stop Room / Location    02/06/17 1055  [facility] / [facility]       Procedure Diagnosis Surgeon Provider    ESOPHAGOGASTRODUODENOSCOPY WITH BIOPSY  CPTCODE:47210 (N/A Esophagus) Epigastric pain; History of stomach ulcers; History of Helicobacter pylori infection  (Epigastric pain [R10.13]; History of stomach ulcers [Z87.19]; History of Helicobacter pylori infection [Z86.19]) MD Ricky Berumen III, MD          Anesthesia Type: general  Last vitals  BP      Temp      Pulse     Resp      SpO2        Post Anesthesia Care and Evaluation    Patient location during evaluation: PHASE II  Patient participation: complete - patient participated  Level of consciousness: awake and alert  Pain score: 1  Pain management: adequate  Airway patency: patent  Anesthetic complications: No anesthetic complications  PONV Status: controlled  Cardiovascular status: acceptable  Respiratory status: acceptable  Hydration status: acceptable

## 2017-02-06 NOTE — ANESTHESIA PREPROCEDURE EVALUATION
Anesthesia Evaluation      No history of anesthetic complications   Airway   Mallampati: I  TM distance: <3 FB  Neck ROM: full  no difficulty expected  Dental - normal exam     Pulmonary - normal exam   (+) asthma,   Cardiovascular - normal exam  (+) hypertension,     Neuro/Psych  GI/Hepatic/Renal/Endo    (+)  diabetes mellitus,     Musculoskeletal     Abdominal  - normal exam    Bowel sounds: normal.   Substance History      OB/GYN          Other                             Anesthesia Plan    ASA 3     general     intravenous induction   Anesthetic plan and risks discussed with patient.  Use of blood products discussed with patient .   Plan discussed with CRNA.

## 2017-02-08 LAB
LAB AP CASE REPORT: NORMAL
Lab: NORMAL
PATH REPORT.FINAL DX SPEC: NORMAL

## 2017-02-16 ENCOUNTER — TRANSCRIBE ORDERS (OUTPATIENT)
Dept: ADMINISTRATIVE | Facility: HOSPITAL | Age: 47
End: 2017-02-16

## 2017-02-16 ENCOUNTER — HOSPITAL ENCOUNTER (OUTPATIENT)
Dept: PHYSICAL THERAPY | Facility: HOSPITAL | Age: 47
Setting detail: THERAPIES SERIES
Discharge: HOME | End: 2017-02-16

## 2017-02-16 DIAGNOSIS — G89.29 CHRONIC RIGHT SHOULDER PAIN: ICD-10-CM

## 2017-02-16 DIAGNOSIS — M25.511 ACUTE PAIN OF RIGHT SHOULDER: ICD-10-CM

## 2017-02-16 DIAGNOSIS — M25.511 ACUTE PAIN OF RIGHT SHOULDER: Primary | ICD-10-CM

## 2017-02-16 DIAGNOSIS — M25.511 CHRONIC RIGHT SHOULDER PAIN: ICD-10-CM

## 2017-02-16 DIAGNOSIS — M54.2 CERVICAL SPINE PAIN: Primary | ICD-10-CM

## 2017-02-16 PROCEDURE — 97110 THERAPEUTIC EXERCISES: CPT

## 2017-02-16 PROCEDURE — G0283 ELEC STIM OTHER THAN WOUND: HCPCS

## 2017-02-16 NOTE — THERAPY DISCHARGE NOTE
Outpatient Physical Therapy Ortho Treatment Note/Discharge Summary   [place]     Patient Name: Albertina Verma  : 1970  MRN: [id number]  Today's Date: 2017      Visit Date: 2017    Visit Dx:    ICD-10-CM ICD-9-CM   1. Cervical spine pain M54.2 723.1   2. Acute pain of right shoulder M25.511 719.41       Patient Active Problem List   Diagnosis   • Epigastric pain   • History of stomach ulcers   • History of Helicobacter pylori infection        Past Medical History   Diagnosis Date   • Abdominal pain    • Asthma    • Diabetes mellitus    • Hypertension    • Nausea         Past Surgical History   Procedure Laterality Date   • Hysterectomy       2006   • Bronchoscopy     • Cholecystectomy     • Tonsillectomy     • Dental procedure     • Cardiac catheterization     • Endoscopy N/A 2017     Procedure: ESOPHAGOGASTRODUODENOSCOPY WITH BIOPSY  CPTCODE:65453;  Surgeon: Sukhi Talley III, MD;  Location: [facility];  Service:              PT Ortho       17 1400    Subjective Comments    Subjective Comments Pt reports little change with therapy and  will seek  an orthopedic consult for examination.  Pt reports her pain at worst is 10/10 and best is 4/10.  Pt reorts most of her pain is in the right shoulder and arm.  -RT    Subjective Pain    Able to rate subjective pain? yes  -RT    Pre-Treatment Pain Level 8  -RT    Posture/Observations    Forward Head Moderate  -RT    Cervical Lordosis Decreased  -RT    Rounded Shoulders Moderate  -RT    Sensation    Sensation WNL? WNL   burning in right fourth digit  -RT    Myotomal Screen- Upper Quarter Clearing    Shoulder flexion (C5) Right:;4- (Good -);Left:;4 (Good)  -RT    Elbow flexion/wrist extension (C6) Right:;4 (Good);Left:;4+ (Good +)  -RT    Elbow extension/wrist flexion (C7) Right:;4 (Good);Left:;4+ (Good +)  -RT    Cervical Palpation    Scalenes Tender  -RT    Spinous Process Tender  -RT    Levator Scapula Tender  -RT    Upper Traps  Right:;Tender  -RT    Cervical/Thoracic Special Tests    Spurlings (Foraminal Compression) Negative  -RT    Shoulder Impingement/Rotator Cuff Special Tests    Chavez-Obed Test (RC Lesion vs. Bursitis) Right:;Negative  -RT    Full Can Test (RC Lesion) Right:;Negative  -RT    Empty Can Test (RC Lesion) Right:;Negative  -RT    Drop Arm Test (Full Thickness RC Lesion) Right:;Negative  -RT    Belly Press (Subscapularis Lesion) Right:;Negative  -RT    Speed's Test (LH of Biceps Lesion) Right:;Negative  -RT    Neck    Flexion AROM Deficit 42  -RT    Extension AROM Deficit 38  -RT    Lt Lat Flexion AROM Deficit 40  -RT    Rt Lateral Flexion AROM Deficit 40  -RT    Lt Rotation AROM Deficit 75  -RT    Rt Rotation AROM Deficit 40  -RT    Right Shoulder    Flexion AROM Deficit 135  -RT    ABduction AROM Deficit 112  -RT    MMT (Manual Muscle Testing)    General MMT Assessment Detail right shoulder flex, ext, ir, er all 4-/5  -RT      User Key  (r) = Recorded By, (t) = Taken By, (c) = Cosigned By    Initials Name Provider Type    RT Turner Billy, PT Physical Therapist                            PT Assessment/Plan       02/16/17 1450          PT Assessment    Assessment Comments Pt reports little change with therapy and has requested to stop tx at this time.  Pt understands HEP and will follow up with a MD.  -RT      PT Plan    PT Plan Comments See discharge  -RT        User Key  (r) = Recorded By, (t) = Taken By, (c) = Cosigned By    Initials Name Provider Type    RT Turner Billy, PT Physical Therapist                    Exercises       02/16/17 1400          Subjective Comments    Subjective Comments Pt reports little change with therapy and  will seek  an orthopedic consult for examination.  Pt reports her pain at worst is 10/10 and best is 4/10.  Pt reorts most of her pain is in the right shoulder and arm.  -RT      Subjective Pain    Able to rate subjective pain? yes  -RT      Pre-Treatment Pain Level 8  -RT       Post-Treatment Pain Level 6  -RT      Exercise 1    Exercise Name 1 review hep and discharge instruction  -RT      Cueing 1 Verbal;Tactile;Demo  -RT      Time (Minutes) 1 25  -RT      Treatment Type 1 Ther Ex  -RT        User Key  (r) = Recorded By, (t) = Taken By, (c) = Cosigned By    Initials Name Provider Type    RT Turner Billy PT Physical Therapist                               PT OP Goals       02/16/17 1400          PT Short Term Goals    STG 1 Pt will be instructed in a HEP for shoulder ROM and stability.  -RT      STG 1 Progress Met  -RT      STG 2 Pt will improve shoulder ROM by 5 degrees in all planes to ease ADLs and work activities.   -RT      STG 2 Progress Not Met  -RT      STG 3 Pt will report a decrease in burning in right UE by 50%.  -RT      STG 3 Progress Not Met  -RT      Long Term Goals    LTG 1 Pt will be independent with HEP for scapular/shoulder strength and ROm.   -RT      LTG 1 Progress Met  -RT      LTG 2 Pt will demonstrate normal right shoulder ROM to ease work and home activities.   -RT      LTG 2 Progress Not Met  -RT      LTG 3 Pt will report being able to perform work day with controlled pain.   -RT      LTG 3 Progress Not Met  -RT      LTG 4 Pt will report an improvement in Quick Dash Score by 10 points to reveal an improvement in daily activities.    -RT      LTG 4 Progress Not Met  -RT        User Key  (r) = Recorded By, (t) = Taken By, (c) = Cosigned By    Initials Name Provider Type    RT Turner Billy PT Physical Therapist                Therapy Education       02/16/17 1449    Therapy Education    Given HEP;Pain management;Posture/body mechanics  -RT    Program Reinforced  -RT    How Provided Verbal;Demonstration  -RT    Provided to Patient  -RT    Level of Understanding Verbalized;Demonstrated  -RT      User Key  (r) = Recorded By, (t) = Taken By, (c) = Cosigned By    Initials Name Provider Type    RT Turner Billy PT Physical Therapist                Outcome  Measures       02/16/17 1400          Quick DASH    Open a tight or new jar. 3  -RT      Do heavy household chores (e.g., wash walls, wash floors) 3  -RT      Carry a shopping bag or briefcase 2  -RT      Wash your back 2  -RT      Use a knife to cut food 1  -RT      Recreational activities in which you take some force or impact through your arm, should or hand (e.g. golf, hammering, tennis, etc.) 4  -RT      During the past week, to what extent has your arm, shoulder, or hand problem interfered with your normal social activites with family, friends, neighbors or groups? 3  -RT      During the past week, were you limited in your work or other regular daily activities as a result of your arm, shoulder or hand problem? 4  -RT      Arm, Shoulder, or hand pain 4  -RT      Tingling (pins and needles) in your arm, shoulder, or hand 4  -RT      During the past week, how much difficulty have you had sleeping because of the pain in your arm, shoulder or hand? 4  -RT      Number of Questions Answered 11  -RT      Quick DASH Score 52.27  -RT      Functional Assessment    Outcome Measure Options Quick DASH  -RT        User Key  (r) = Recorded By, (t) = Taken By, (c) = Cosigned By    Initials Name Provider Type    RT Turner Billy, PT Physical Therapist            Time Calculation:   Start Time: 1400  Stop Time: 1440  Time Calculation (min): 40 min    Therapy Charges for Today     Code Description Service Date Service Provider Modifiers Qty    18258714627 HC PT THER PROC EA 15 MIN 2/16/2017 Turner Billy, PT GP 2    07255826698 HC ELECTRICAL STIM UNATTENDED 2/16/2017 Turner Billy, PT  1          PT G-Codes  Outcome Measure Options: Quick DASH     OP PT Discharge Summary  Date of Discharge: 02/16/17  Reason for Discharge: Patient/Caregiver request  Outcomes Achieved: Unable to make functional progress toward goals at this time  Discharge Destination: Home with home program  Discharge Instructions: Pt has been instructed in  a HEP and has demonstrated poor response to treatment with therapy.  Pt has requested to be discharged and will seek an orthopedic consult.      Turner Billy, PT  2/16/2017

## 2017-02-18 ENCOUNTER — HOSPITAL ENCOUNTER (OUTPATIENT)
Dept: MRI IMAGING | Facility: HOSPITAL | Age: 47
Discharge: HOME | End: 2017-02-18
Admitting: NURSE PRACTITIONER

## 2017-02-18 DIAGNOSIS — G89.29 CHRONIC RIGHT SHOULDER PAIN: ICD-10-CM

## 2017-02-18 DIAGNOSIS — M25.511 CHRONIC RIGHT SHOULDER PAIN: ICD-10-CM

## 2017-02-18 PROCEDURE — 73221 MRI JOINT UPR EXTREM W/O DYE: CPT | Performed by: RADIOLOGY

## 2017-02-18 PROCEDURE — 73221 MRI JOINT UPR EXTREM W/O DYE: CPT

## 2017-02-20 ENCOUNTER — APPOINTMENT (OUTPATIENT)
Dept: PHYSICAL THERAPY | Facility: HOSPITAL | Age: 47
End: 2017-02-20

## 2017-04-03 ENCOUNTER — TRANSCRIBE ORDERS (OUTPATIENT)
Dept: PHYSICAL THERAPY | Facility: HOSPITAL | Age: 47
End: 2017-04-03

## 2017-04-03 DIAGNOSIS — M54.2 CERVICAL SPINE PAIN: Primary | ICD-10-CM

## 2017-04-25 ENCOUNTER — APPOINTMENT (OUTPATIENT)
Dept: CT IMAGING | Facility: HOSPITAL | Age: 47
End: 2017-04-25

## 2017-04-25 ENCOUNTER — HOSPITAL ENCOUNTER (EMERGENCY)
Facility: HOSPITAL | Age: 47
Discharge: HOME | End: 2017-04-25
Attending: EMERGENCY MEDICINE | Admitting: EMERGENCY MEDICINE

## 2017-04-25 VITALS
SYSTOLIC BLOOD PRESSURE: 138 MMHG | BODY MASS INDEX: 40.32 KG/M2 | TEMPERATURE: 97.6 F | DIASTOLIC BLOOD PRESSURE: 96 MMHG | HEART RATE: 82 BPM | RESPIRATION RATE: 20 BRPM | OXYGEN SATURATION: 98 % | WEIGHT: 200 LBS | HEIGHT: 59 IN

## 2017-04-25 DIAGNOSIS — K57.32 DIVERTICULITIS OF LARGE INTESTINE WITHOUT PERFORATION OR ABSCESS WITHOUT BLEEDING: Primary | ICD-10-CM

## 2017-04-25 LAB
ANION GAP SERPL CALCULATED.3IONS-SCNC: 1.9 MMOL/L (ref 3.6–11.2)
BASOPHILS # BLD AUTO: 0.04 10*3/MM3 (ref 0–0.3)
BASOPHILS NFR BLD AUTO: 0.3 % (ref 0–2)
BILIRUB UR QL STRIP: NEGATIVE
BUN BLD-MCNC: 11 MG/DL (ref 7–21)
BUN/CREAT SERPL: 14.1 (ref 7–25)
CALCIUM SPEC-SCNC: 9.3 MG/DL (ref 7.7–10)
CHLORIDE SERPL-SCNC: 105 MMOL/L (ref 99–112)
CLARITY UR: CLEAR
CO2 SERPL-SCNC: 30.1 MMOL/L (ref 24.3–31.9)
COLOR UR: YELLOW
CREAT BLD-MCNC: 0.78 MG/DL (ref 0.43–1.29)
DEPRECATED RDW RBC AUTO: 40.9 FL (ref 37–54)
EOSINOPHIL # BLD AUTO: 0.18 10*3/MM3 (ref 0–0.7)
EOSINOPHIL NFR BLD AUTO: 1.3 % (ref 0–5)
ERYTHROCYTE [DISTWIDTH] IN BLOOD BY AUTOMATED COUNT: 12.9 % (ref 11.5–14.5)
GFR SERPL CREATININE-BSD FRML MDRD: 80 ML/MIN/1.73
GLUCOSE BLD-MCNC: 96 MG/DL (ref 70–110)
GLUCOSE UR STRIP-MCNC: NEGATIVE MG/DL
HCT VFR BLD AUTO: 41.4 % (ref 37–47)
HGB BLD-MCNC: 14.1 G/DL (ref 12–16)
HGB UR QL STRIP.AUTO: NEGATIVE
IMM GRANULOCYTES # BLD: 0.03 10*3/MM3 (ref 0–0.03)
IMM GRANULOCYTES NFR BLD: 0.2 % (ref 0–0.5)
KETONES UR QL STRIP: NEGATIVE
LEUKOCYTE ESTERASE UR QL STRIP.AUTO: NEGATIVE
LYMPHOCYTES # BLD AUTO: 3.36 10*3/MM3 (ref 1–3)
LYMPHOCYTES NFR BLD AUTO: 24.8 % (ref 21–51)
MCH RBC QN AUTO: 30.3 PG (ref 27–33)
MCHC RBC AUTO-ENTMCNC: 34.1 G/DL (ref 33–37)
MCV RBC AUTO: 88.8 FL (ref 80–94)
MONOCYTES # BLD AUTO: 0.83 10*3/MM3 (ref 0.1–0.9)
MONOCYTES NFR BLD AUTO: 6.1 % (ref 0–10)
NEUTROPHILS # BLD AUTO: 9.09 10*3/MM3 (ref 1.4–6.5)
NEUTROPHILS NFR BLD AUTO: 67.3 % (ref 30–70)
NITRITE UR QL STRIP: NEGATIVE
OSMOLALITY SERPL CALC.SUM OF ELEC: 273.1 MOSM/KG (ref 273–305)
PH UR STRIP.AUTO: <=5 [PH] (ref 5–8)
PLATELET # BLD AUTO: 325 10*3/MM3 (ref 130–400)
PMV BLD AUTO: 9.2 FL (ref 6–10)
POTASSIUM BLD-SCNC: 4.2 MMOL/L (ref 3.5–5.3)
PROT UR QL STRIP: NEGATIVE
RBC # BLD AUTO: 4.66 10*6/MM3 (ref 4.2–5.4)
SODIUM BLD-SCNC: 137 MMOL/L (ref 135–153)
SP GR UR STRIP: 1.01 (ref 1–1.03)
UROBILINOGEN UR QL STRIP: NORMAL
WBC NRBC COR # BLD: 13.53 10*3/MM3 (ref 4.5–12.5)

## 2017-04-25 PROCEDURE — 25010000002 ONDANSETRON PER 1 MG: Performed by: EMERGENCY MEDICINE

## 2017-04-25 PROCEDURE — 96361 HYDRATE IV INFUSION ADD-ON: CPT

## 2017-04-25 PROCEDURE — 74176 CT ABD & PELVIS W/O CONTRAST: CPT

## 2017-04-25 PROCEDURE — 80048 BASIC METABOLIC PNL TOTAL CA: CPT | Performed by: EMERGENCY MEDICINE

## 2017-04-25 PROCEDURE — 25010000002 HYDROMORPHONE PER 4 MG: Performed by: EMERGENCY MEDICINE

## 2017-04-25 PROCEDURE — 74176 CT ABD & PELVIS W/O CONTRAST: CPT | Performed by: RADIOLOGY

## 2017-04-25 PROCEDURE — 99284 EMERGENCY DEPT VISIT MOD MDM: CPT

## 2017-04-25 PROCEDURE — 96374 THER/PROPH/DIAG INJ IV PUSH: CPT

## 2017-04-25 PROCEDURE — 85025 COMPLETE CBC W/AUTO DIFF WBC: CPT | Performed by: EMERGENCY MEDICINE

## 2017-04-25 PROCEDURE — 25010000002 KETOROLAC TROMETHAMINE PER 15 MG: Performed by: EMERGENCY MEDICINE

## 2017-04-25 PROCEDURE — 96375 TX/PRO/DX INJ NEW DRUG ADDON: CPT

## 2017-04-25 PROCEDURE — 81003 URINALYSIS AUTO W/O SCOPE: CPT | Performed by: EMERGENCY MEDICINE

## 2017-04-25 RX ORDER — HYDROCODONE BITARTRATE AND ACETAMINOPHEN 5; 325 MG/1; MG/1
1 TABLET ORAL EVERY 6 HOURS PRN
Qty: 12 TABLET | Refills: 0 | OUTPATIENT
Start: 2017-04-25 | End: 2021-09-02

## 2017-04-25 RX ORDER — METRONIDAZOLE 500 MG/1
500 TABLET ORAL 2 TIMES DAILY
Qty: 14 TABLET | Refills: 0 | OUTPATIENT
Start: 2017-04-25 | End: 2021-09-02

## 2017-04-25 RX ORDER — KETOROLAC TROMETHAMINE 30 MG/ML
30 INJECTION, SOLUTION INTRAMUSCULAR; INTRAVENOUS ONCE
Status: COMPLETED | OUTPATIENT
Start: 2017-04-25 | End: 2017-04-25

## 2017-04-25 RX ORDER — ONDANSETRON 4 MG/1
4 TABLET, FILM COATED ORAL EVERY 6 HOURS
Qty: 10 TABLET | Refills: 0 | OUTPATIENT
Start: 2017-04-25 | End: 2021-09-02

## 2017-04-25 RX ORDER — CIPROFLOXACIN 500 MG/1
500 TABLET, FILM COATED ORAL 2 TIMES DAILY
Qty: 14 TABLET | Refills: 0 | OUTPATIENT
Start: 2017-04-25 | End: 2021-09-02

## 2017-04-25 RX ORDER — ONDANSETRON 2 MG/ML
4 INJECTION INTRAMUSCULAR; INTRAVENOUS ONCE
Status: COMPLETED | OUTPATIENT
Start: 2017-04-25 | End: 2017-04-25

## 2017-04-25 RX ORDER — SODIUM CHLORIDE 0.9 % (FLUSH) 0.9 %
10 SYRINGE (ML) INJECTION AS NEEDED
Status: DISCONTINUED | OUTPATIENT
Start: 2017-04-25 | End: 2017-04-25 | Stop reason: HOSPADM

## 2017-04-25 RX ADMIN — SODIUM CHLORIDE 1000 ML: 9 INJECTION, SOLUTION INTRAVENOUS at 14:12

## 2017-04-25 RX ADMIN — HYDROMORPHONE HYDROCHLORIDE 1 MG: 1 INJECTION, SOLUTION INTRAMUSCULAR; INTRAVENOUS; SUBCUTANEOUS at 15:03

## 2017-04-25 RX ADMIN — KETOROLAC TROMETHAMINE 30 MG: 30 INJECTION, SOLUTION INTRAMUSCULAR; INTRAVENOUS at 14:12

## 2017-04-25 RX ADMIN — ONDANSETRON 4 MG: 2 INJECTION, SOLUTION INTRAMUSCULAR; INTRAVENOUS at 14:12

## 2017-04-25 NOTE — ED NOTES
Pt discharged home with family, ambulatory, AAOx3 with NADN.     Verenice Chandra, RN  04/25/17 5738

## 2017-04-25 NOTE — DISCHARGE INSTRUCTIONS

## 2017-04-25 NOTE — ED NOTES
Pt reports that she went to her PCP this am for abd pain and tenderness in her left side that started x 2 days ago.  Reports that they performed a urine sample and informed her that there was a trace of blood and informed her to come to the ER for evaluation for a possible kidney stone.  Pt family at bedside.     Verenice Chandra RN  04/25/17 6551

## 2017-04-25 NOTE — ED NOTES
Pt resting quietly on stretcher with no complaints.  Pt AAOx4 with no resp distress noted, respirations even and unlabored.  Pt denies any needs at this time.  Skin PWD.  Pt family at bedside. Will continue to monitor and follow plan of care.  Bed rails up x2, bed in lowest position, call light in reach.       Verenice Chandra RN  04/25/17 8592

## 2017-04-25 NOTE — ED NOTES
Pt resting quietly on stretcher with no complaints.  Pt AAOx4 with no resp distress noted, respirations even and unlabored.  Pt denies any needs at this time.  Skin PWD.  Pt family at bedside. Will continue to monitor and follow plan of care.  Bed rails up x2, bed in lowest position, call light in reach.       Verenice Chandra RN  04/25/17 3356

## 2017-04-25 NOTE — ED NOTES
Pt resting quietly on stretcher with no complaints other than the remaining abd pain.  Pt AAOx4 with no resp distress noted, respirations even and unlabored.  Pt denies any needs at this time.  Skin PWD.  Pt family at bedside. Will continue to monitor and follow plan of care.  Bed rails up x2, bed in lowest position, call light in reach.       Verenice Chandra RN  04/25/17 6229

## 2017-04-25 NOTE — ED PROVIDER NOTES
Subjective   Patient is a 46 y.o. female presenting with abdominal pain.   History provided by:  Patient   used: No    Abdominal Pain   Pain location:  LLQ  Pain quality: sharp    Pain radiates to:  Does not radiate  Pain severity:  Moderate  Onset quality:  Sudden  Duration:  1 day  Timing:  Constant  Progression:  Worsening  Chronicity:  New  Context: eating    Relieved by:  Nothing  Worsened by:  Movement  Ineffective treatments:  None tried  Associated symptoms: no chest pain, no constipation, no diarrhea, no dysuria, no fever, no nausea and no vomiting        Review of Systems   Constitutional: Negative.  Negative for fever.   HENT: Negative.    Respiratory: Negative.    Cardiovascular: Negative.  Negative for chest pain.   Gastrointestinal: Positive for abdominal pain. Negative for constipation, diarrhea, nausea and vomiting.   Endocrine: Negative.    Genitourinary: Negative.  Negative for dysuria.   Skin: Negative.    Neurological: Negative.    Psychiatric/Behavioral: Negative.    All other systems reviewed and are negative.      Past Medical History:   Diagnosis Date   • Abdominal pain    • Asthma    • Diabetes mellitus    • Hypertension    • Nausea        Allergies   Allergen Reactions   • Erythromycin    • Penicillins    • Sulfa Antibiotics        Past Surgical History:   Procedure Laterality Date   • BRONCHOSCOPY     • CARDIAC CATHETERIZATION     • CHOLECYSTECTOMY     • DENTAL PROCEDURE     • ENDOSCOPY N/A 2/6/2017    Procedure: ESOPHAGOGASTRODUODENOSCOPY WITH BIOPSY  CPTCODE:97747;  Surgeon: Sukhi Talley III, MD;  Location: [facility];  Service:    • HYSTERECTOMY      2006   • TONSILLECTOMY         Family History   Problem Relation Age of Onset   • Breast cancer Maternal Grandmother        Social History     Social History   • Marital status:      Spouse name: N/A   • Number of children: N/A   • Years of education: N/A     Social History Main Topics   • Smoking status:  Current Every Day Smoker     Packs/day: 0.50     Years: 25.00     Types: Cigarettes   • Smokeless tobacco: Never Used   • Alcohol use No   • Drug use: No   • Sexual activity: Defer     Other Topics Concern   • None     Social History Narrative           Objective   Physical Exam   Constitutional: She is oriented to person, place, and time. She appears well-developed and well-nourished. No distress.   HENT:   Head: Normocephalic and atraumatic.   Right Ear: External ear normal.   Left Ear: External ear normal.   Nose: Nose normal.   Eyes: Conjunctivae and EOM are normal. Pupils are equal, round, and reactive to light.   Neck: Normal range of motion. Neck supple. No JVD present. No tracheal deviation present.   Cardiovascular: Normal rate, regular rhythm and normal heart sounds.    No murmur heard.  Pulmonary/Chest: Effort normal and breath sounds normal. No respiratory distress. She has no wheezes.   Abdominal: Soft. Bowel sounds are normal. There is tenderness in the left lower quadrant.   Musculoskeletal: Normal range of motion. She exhibits no edema or deformity.   Neurological: She is alert and oriented to person, place, and time. No cranial nerve deficit.   Skin: Skin is warm and dry. No rash noted. She is not diaphoretic. No erythema. No pallor.   Psychiatric: She has a normal mood and affect. Her behavior is normal. Thought content normal.   Nursing note and vitals reviewed.      Procedures         ED Course  ED Course      CT Abdomen Pelvis Stone Protocol   ED Interpretation   Read by Rad.      Final Result   : Abnormal thickening of the distal descending colon and adjacent   stranding most likely representing acute diverticulitis. Follow-up   imaging after treatment of the acute event is suggested to confirm   resolution                     This report was finalized on 4/25/2017 3:44 PM by Dr. Nba Rivers MD.                        MDM    Final diagnoses:   Diverticulitis of large intestine without  perforation or abscess without bleeding            Wm Diaz MD  04/25/17 1323

## 2017-09-23 ENCOUNTER — APPOINTMENT (OUTPATIENT)
Dept: GENERAL RADIOLOGY | Facility: HOSPITAL | Age: 47
End: 2017-09-23

## 2017-09-23 LAB
ALBUMIN SERPL-MCNC: 4.5 G/DL (ref 3.5–5)
ALBUMIN/GLOB SERPL: 1.4 G/DL (ref 1.5–2.5)
ALP SERPL-CCNC: 97 U/L (ref 35–104)
ALT SERPL W P-5'-P-CCNC: 33 U/L (ref 10–36)
ANION GAP SERPL CALCULATED.3IONS-SCNC: 6.5 MMOL/L (ref 3.6–11.2)
AST SERPL-CCNC: 26 U/L (ref 10–30)
BASOPHILS # BLD AUTO: 0.04 10*3/MM3 (ref 0–0.3)
BASOPHILS NFR BLD AUTO: 0.4 % (ref 0–2)
BILIRUB SERPL-MCNC: 0.3 MG/DL (ref 0.2–1.8)
BUN BLD-MCNC: 12 MG/DL (ref 7–21)
BUN/CREAT SERPL: 13.6 (ref 7–25)
CALCIUM SPEC-SCNC: 9.7 MG/DL (ref 7.7–10)
CHLORIDE SERPL-SCNC: 106 MMOL/L (ref 99–112)
CO2 SERPL-SCNC: 25.5 MMOL/L (ref 24.3–31.9)
CREAT BLD-MCNC: 0.88 MG/DL (ref 0.43–1.29)
D-LACTATE SERPL-SCNC: 0.8 MMOL/L (ref 0.5–2)
DEPRECATED RDW RBC AUTO: 40 FL (ref 37–54)
EOSINOPHIL # BLD AUTO: 0.38 10*3/MM3 (ref 0–0.7)
EOSINOPHIL NFR BLD AUTO: 3.6 % (ref 0–5)
ERYTHROCYTE [DISTWIDTH] IN BLOOD BY AUTOMATED COUNT: 12.7 % (ref 11.5–14.5)
GFR SERPL CREATININE-BSD FRML MDRD: 70 ML/MIN/1.73
GLOBULIN UR ELPH-MCNC: 3.3 GM/DL
GLUCOSE BLD-MCNC: 122 MG/DL (ref 70–110)
HCT VFR BLD AUTO: 40.8 % (ref 37–47)
HGB BLD-MCNC: 13.7 G/DL (ref 12–16)
IMM GRANULOCYTES # BLD: 0.02 10*3/MM3 (ref 0–0.03)
IMM GRANULOCYTES NFR BLD: 0.2 % (ref 0–0.5)
LYMPHOCYTES # BLD AUTO: 4.11 10*3/MM3 (ref 1–3)
LYMPHOCYTES NFR BLD AUTO: 38.6 % (ref 21–51)
MCH RBC QN AUTO: 29.6 PG (ref 27–33)
MCHC RBC AUTO-ENTMCNC: 33.6 G/DL (ref 33–37)
MCV RBC AUTO: 88.1 FL (ref 80–94)
MONOCYTES # BLD AUTO: 0.69 10*3/MM3 (ref 0.1–0.9)
MONOCYTES NFR BLD AUTO: 6.5 % (ref 0–10)
NEUTROPHILS # BLD AUTO: 5.4 10*3/MM3 (ref 1.4–6.5)
NEUTROPHILS NFR BLD AUTO: 50.7 % (ref 30–70)
OSMOLALITY SERPL CALC.SUM OF ELEC: 276.7 MOSM/KG (ref 273–305)
PLATELET # BLD AUTO: 322 10*3/MM3 (ref 130–400)
PMV BLD AUTO: 9.2 FL (ref 6–10)
POTASSIUM BLD-SCNC: 4 MMOL/L (ref 3.5–5.3)
PROT SERPL-MCNC: 7.8 G/DL (ref 6–8)
RBC # BLD AUTO: 4.63 10*6/MM3 (ref 4.2–5.4)
SODIUM BLD-SCNC: 138 MMOL/L (ref 135–153)
TROPONIN I SERPL-MCNC: <0.006 NG/ML
WBC NRBC COR # BLD: 10.64 10*3/MM3 (ref 4.5–12.5)

## 2017-09-23 PROCEDURE — 93010 ELECTROCARDIOGRAM REPORT: CPT | Performed by: INTERNAL MEDICINE

## 2017-09-23 PROCEDURE — 87040 BLOOD CULTURE FOR BACTERIA: CPT | Performed by: FAMILY MEDICINE

## 2017-09-23 PROCEDURE — 83605 ASSAY OF LACTIC ACID: CPT | Performed by: FAMILY MEDICINE

## 2017-09-23 PROCEDURE — 84484 ASSAY OF TROPONIN QUANT: CPT | Performed by: FAMILY MEDICINE

## 2017-09-23 PROCEDURE — 85379 FIBRIN DEGRADATION QUANT: CPT | Performed by: NURSE PRACTITIONER

## 2017-09-23 PROCEDURE — 93005 ELECTROCARDIOGRAM TRACING: CPT | Performed by: FAMILY MEDICINE

## 2017-09-23 PROCEDURE — 71020 HC CHEST PA AND LATERAL: CPT

## 2017-09-23 PROCEDURE — 85025 COMPLETE CBC W/AUTO DIFF WBC: CPT | Performed by: FAMILY MEDICINE

## 2017-09-23 PROCEDURE — 80053 COMPREHEN METABOLIC PANEL: CPT | Performed by: FAMILY MEDICINE

## 2017-09-23 PROCEDURE — 99284 EMERGENCY DEPT VISIT MOD MDM: CPT

## 2017-09-23 PROCEDURE — 71020 XR CHEST 2 VW: CPT | Performed by: RADIOLOGY

## 2017-09-23 RX ORDER — SODIUM CHLORIDE 0.9 % (FLUSH) 0.9 %
10 SYRINGE (ML) INJECTION AS NEEDED
Status: DISCONTINUED | OUTPATIENT
Start: 2017-09-23 | End: 2017-09-24 | Stop reason: HOSPADM

## 2017-09-24 ENCOUNTER — HOSPITAL ENCOUNTER (EMERGENCY)
Facility: HOSPITAL | Age: 47
Discharge: HOME | End: 2017-09-24
Attending: FAMILY MEDICINE | Admitting: FAMILY MEDICINE

## 2017-09-24 VITALS
WEIGHT: 187 LBS | DIASTOLIC BLOOD PRESSURE: 80 MMHG | OXYGEN SATURATION: 98 % | TEMPERATURE: 98 F | HEART RATE: 80 BPM | BODY MASS INDEX: 37.7 KG/M2 | RESPIRATION RATE: 18 BRPM | HEIGHT: 59 IN | SYSTOLIC BLOOD PRESSURE: 122 MMHG

## 2017-09-24 DIAGNOSIS — Z87.09 HISTORY OF ASTHMA: ICD-10-CM

## 2017-09-24 DIAGNOSIS — J06.9 URI WITH COUGH AND CONGESTION: Primary | ICD-10-CM

## 2017-09-24 LAB
D DIMER PPP FEU-MCNC: 0.39 MCGFEU/ML (ref 0–0.5)
HOLD SPECIMEN: NORMAL
HOLD SPECIMEN: NORMAL
WHOLE BLOOD HOLD SPECIMEN: NORMAL
WHOLE BLOOD HOLD SPECIMEN: NORMAL

## 2017-09-24 PROCEDURE — 87040 BLOOD CULTURE FOR BACTERIA: CPT | Performed by: FAMILY MEDICINE

## 2017-09-24 PROCEDURE — 94799 UNLISTED PULMONARY SVC/PX: CPT

## 2017-09-24 PROCEDURE — 25010000002 ONDANSETRON PER 1 MG: Performed by: FAMILY MEDICINE

## 2017-09-24 PROCEDURE — 94640 AIRWAY INHALATION TREATMENT: CPT

## 2017-09-24 PROCEDURE — 96374 THER/PROPH/DIAG INJ IV PUSH: CPT

## 2017-09-24 PROCEDURE — 25010000002 METHYLPREDNISOLONE PER 125 MG: Performed by: FAMILY MEDICINE

## 2017-09-24 PROCEDURE — 96375 TX/PRO/DX INJ NEW DRUG ADDON: CPT

## 2017-09-24 RX ORDER — HYDROCODONE BITARTRATE AND ACETAMINOPHEN 5; 325 MG/1; MG/1
1 TABLET ORAL ONCE
Status: COMPLETED | OUTPATIENT
Start: 2017-09-24 | End: 2017-09-24

## 2017-09-24 RX ORDER — ONDANSETRON 2 MG/ML
4 INJECTION INTRAMUSCULAR; INTRAVENOUS ONCE
Status: COMPLETED | OUTPATIENT
Start: 2017-09-24 | End: 2017-09-24

## 2017-09-24 RX ORDER — IPRATROPIUM BROMIDE AND ALBUTEROL SULFATE 2.5; .5 MG/3ML; MG/3ML
3 SOLUTION RESPIRATORY (INHALATION) ONCE
Status: COMPLETED | OUTPATIENT
Start: 2017-09-24 | End: 2017-09-24

## 2017-09-24 RX ORDER — GUAIFENESIN AND CODEINE PHOSPHATE 100; 10 MG/5ML; MG/5ML
10 SOLUTION ORAL 4 TIMES DAILY PRN
Qty: 200 ML | Refills: 0 | Status: SHIPPED | OUTPATIENT
Start: 2017-09-24 | End: 2018-01-05

## 2017-09-24 RX ORDER — CODEINE PHOSPHATE AND GUAIFENESIN 10; 100 MG/5ML; MG/5ML
10 SOLUTION ORAL ONCE
Status: COMPLETED | OUTPATIENT
Start: 2017-09-24 | End: 2017-09-24

## 2017-09-24 RX ORDER — DOXYCYCLINE 100 MG/1
100 CAPSULE ORAL 2 TIMES DAILY
Qty: 20 CAPSULE | Refills: 0 | Status: SHIPPED | OUTPATIENT
Start: 2017-09-24 | End: 2017-10-04

## 2017-09-24 RX ORDER — FAMOTIDINE 10 MG/ML
20 INJECTION, SOLUTION INTRAVENOUS ONCE
Status: COMPLETED | OUTPATIENT
Start: 2017-09-24 | End: 2017-09-24

## 2017-09-24 RX ORDER — METHYLPREDNISOLONE SODIUM SUCCINATE 125 MG/2ML
125 INJECTION, POWDER, LYOPHILIZED, FOR SOLUTION INTRAMUSCULAR; INTRAVENOUS ONCE
Status: COMPLETED | OUTPATIENT
Start: 2017-09-24 | End: 2017-09-24

## 2017-09-24 RX ORDER — PREDNISONE 20 MG/1
20 TABLET ORAL 2 TIMES DAILY
Qty: 6 TABLET | Refills: 0 | Status: SHIPPED | OUTPATIENT
Start: 2017-09-24 | End: 2017-09-27

## 2017-09-24 RX ORDER — LEVOFLOXACIN 5 MG/ML
750 INJECTION, SOLUTION INTRAVENOUS ONCE
Status: DISCONTINUED | OUTPATIENT
Start: 2017-09-24 | End: 2017-09-24

## 2017-09-24 RX ORDER — ALBUTEROL SULFATE 90 UG/1
2 AEROSOL, METERED RESPIRATORY (INHALATION) EVERY 4 HOURS PRN
Qty: 1 INHALER | Refills: 0 | Status: SHIPPED | OUTPATIENT
Start: 2017-09-24

## 2017-09-24 RX ADMIN — GUAIFENESIN AND CODEINE PHOSPHATE 10 ML: 100; 10 SOLUTION ORAL at 01:00

## 2017-09-24 RX ADMIN — ONDANSETRON 4 MG: 2 INJECTION INTRAMUSCULAR; INTRAVENOUS at 00:50

## 2017-09-24 RX ADMIN — IPRATROPIUM BROMIDE AND ALBUTEROL SULFATE 3 ML: .5; 3 SOLUTION RESPIRATORY (INHALATION) at 01:18

## 2017-09-24 RX ADMIN — IPRATROPIUM BROMIDE AND ALBUTEROL SULFATE 3 ML: .5; 3 SOLUTION RESPIRATORY (INHALATION) at 00:41

## 2017-09-24 RX ADMIN — HYDROCODONE BITARTRATE AND ACETAMINOPHEN 1 TABLET: 5; 325 TABLET ORAL at 01:16

## 2017-09-24 RX ADMIN — FAMOTIDINE 20 MG: 10 INJECTION INTRAVENOUS at 00:57

## 2017-09-24 RX ADMIN — METHYLPREDNISOLONE SODIUM SUCCINATE 125 MG: 125 INJECTION, POWDER, FOR SOLUTION INTRAMUSCULAR; INTRAVENOUS at 00:58

## 2017-09-24 NOTE — ED NOTES
Called Penny with respiratory advised of Duo-Neb order. But wait 20 minutes and then do it. Per Dr. Jacques.      Heather Symes  09/24/17 0108

## 2017-09-24 NOTE — ED PROVIDER NOTES
Subjective   History of Present Illness  47 y/o F w/h/o asthma p/w worsening cough and SOA for 5 days. Pt states that she underwent carpal tunnel surgery 5 days PTP w/ general anesthesia and has had worsening cough and SOA since that time. No CP. No fevers/chills. Pt states her cough is severe, persistent. Pt was noted by nursing staff to be outside smoking several times while waiting to come back for treatment.   Review of Systems   Constitutional: Negative for chills, fatigue and fever.   Eyes: Negative for photophobia and visual disturbance.   Respiratory: Positive for cough, shortness of breath and wheezing. Negative for choking and chest tightness.    Cardiovascular: Negative for chest pain, palpitations and leg swelling.   Gastrointestinal: Negative for abdominal distention, abdominal pain, nausea and vomiting.   Genitourinary: Negative for difficulty urinating, dysuria, flank pain, frequency and urgency.   Musculoskeletal: Negative for back pain and neck pain.   Skin: Negative for color change and pallor.   All other systems reviewed and are negative.      Past Medical History:   Diagnosis Date   • Abdominal pain    • Asthma    • Diabetes mellitus    • Hypertension    • Nausea        Allergies   Allergen Reactions   • Erythromycin    • Penicillins    • Sulfa Antibiotics        Past Surgical History:   Procedure Laterality Date   • BRONCHOSCOPY     • CARDIAC CATHETERIZATION     • CHOLECYSTECTOMY     • DENTAL PROCEDURE     • ENDOSCOPY N/A 2/6/2017    Procedure: ESOPHAGOGASTRODUODENOSCOPY WITH BIOPSY  CPTCODE:18383;  Surgeon: Sukhi Talley III, MD;  Location: [facility];  Service:    • HYSTERECTOMY      2006   • TONSILLECTOMY         Family History   Problem Relation Age of Onset   • Breast cancer Maternal Grandmother        Social History     Social History   • Marital status:      Spouse name: N/A   • Number of children: N/A   • Years of education: N/A     Social History Main Topics   • Smoking  status: Current Every Day Smoker     Packs/day: 0.50     Years: 25.00     Types: Cigarettes   • Smokeless tobacco: Never Used   • Alcohol use No   • Drug use: No   • Sexual activity: Defer     Other Topics Concern   • Not on file     Social History Narrative           Objective   Physical Exam   Constitutional: She is oriented to person, place, and time. She appears well-developed and well-nourished. She is active.   HENT:   Head: Normocephalic and atraumatic.   Right Ear: Hearing and external ear normal.   Left Ear: Hearing and external ear normal.   Nose: Nose normal.   Mouth/Throat: Uvula is midline, oropharynx is clear and moist and mucous membranes are normal.   Eyes: Conjunctivae, EOM and lids are normal. Pupils are equal, round, and reactive to light.   Neck: Trachea normal, normal range of motion, full passive range of motion without pain and phonation normal. Neck supple.   Cardiovascular: Normal rate, regular rhythm and normal heart sounds.    Pulmonary/Chest: Effort normal. She has no decreased breath sounds. She has wheezes.   Abdominal: Normal appearance.   Neurological: She is alert and oriented to person, place, and time. GCS eye subscore is 4. GCS verbal subscore is 5. GCS motor subscore is 6.   Skin: Skin is warm, dry and intact.   Psychiatric: She has a normal mood and affect. Her speech is normal and behavior is normal. Cognition and memory are normal.   Nursing note and vitals reviewed.      Procedures         ED Course  ED Course      Abx held b/c pt has no elevated WBC, no hypotension. Pt given breathing treatments and IV steroids while here with improvement in symptoms. Pt states that she can tolerate rocephin and has had it in the past without rxn.            MDM  Number of Diagnoses or Management Options  History of asthma: established and worsening  URI with cough and congestion: new and requires workup     Amount and/or Complexity of Data Reviewed  Clinical lab tests: reviewed and  ordered  Tests in the radiology section of CPT®: reviewed and ordered  Tests in the medicine section of CPT®: reviewed and ordered  Independent visualization of images, tracings, or specimens: yes    Risk of Complications, Morbidity, and/or Mortality  Presenting problems: high  Diagnostic procedures: high  Management options: high    Patient Progress  Patient progress: stable      Final diagnoses:   URI with cough and congestion   History of asthma            Dl Jacques MD  09/24/17 0128

## 2017-09-25 ENCOUNTER — APPOINTMENT (OUTPATIENT)
Dept: GENERAL RADIOLOGY | Facility: HOSPITAL | Age: 47
End: 2017-09-25

## 2017-09-25 ENCOUNTER — HOSPITAL ENCOUNTER (EMERGENCY)
Facility: HOSPITAL | Age: 47
Discharge: HOME | End: 2017-09-25
Attending: EMERGENCY MEDICINE | Admitting: EMERGENCY MEDICINE

## 2017-09-25 VITALS
WEIGHT: 190 LBS | TEMPERATURE: 98.8 F | RESPIRATION RATE: 18 BRPM | DIASTOLIC BLOOD PRESSURE: 89 MMHG | SYSTOLIC BLOOD PRESSURE: 146 MMHG | BODY MASS INDEX: 38.3 KG/M2 | HEART RATE: 74 BPM | OXYGEN SATURATION: 97 % | HEIGHT: 59 IN

## 2017-09-25 DIAGNOSIS — J20.8 ACUTE BACTERIAL BRONCHITIS: Primary | ICD-10-CM

## 2017-09-25 DIAGNOSIS — B96.89 ACUTE BACTERIAL BRONCHITIS: Primary | ICD-10-CM

## 2017-09-25 LAB
A-A DO2: 23.9 MMHG (ref 0–300)
ALBUMIN SERPL-MCNC: 4.3 G/DL (ref 3.5–5)
ALBUMIN/GLOB SERPL: 1.4 G/DL (ref 1.5–2.5)
ALP SERPL-CCNC: 76 U/L (ref 35–104)
ALT SERPL W P-5'-P-CCNC: 39 U/L (ref 10–36)
ANION GAP SERPL CALCULATED.3IONS-SCNC: 5.3 MMOL/L (ref 3.6–11.2)
ARTERIAL PATENCY WRIST A: POSITIVE
AST SERPL-CCNC: 31 U/L (ref 10–30)
ATMOSPHERIC PRESS: 726 MMHG
BASE EXCESS BLDA CALC-SCNC: -0.4 MMOL/L
BASOPHILS # BLD AUTO: 0.02 10*3/MM3 (ref 0–0.3)
BASOPHILS NFR BLD AUTO: 0.1 % (ref 0–2)
BDY SITE: ABNORMAL
BILIRUB SERPL-MCNC: 0.3 MG/DL (ref 0.2–1.8)
BILIRUB UR QL STRIP: NEGATIVE
BODY TEMPERATURE: 98.6 C
BUN BLD-MCNC: 17 MG/DL (ref 7–21)
BUN/CREAT SERPL: 21.5 (ref 7–25)
CALCIUM SPEC-SCNC: 9.5 MG/DL (ref 7.7–10)
CHLORIDE SERPL-SCNC: 109 MMOL/L (ref 99–112)
CLARITY UR: CLEAR
CO2 SERPL-SCNC: 23.7 MMOL/L (ref 24.3–31.9)
COHGB MFR BLD: 2.7 % (ref 0–5)
COLOR UR: YELLOW
CREAT BLD-MCNC: 0.79 MG/DL (ref 0.43–1.29)
D-LACTATE SERPL-SCNC: 1 MMOL/L (ref 0.5–2)
DEPRECATED RDW RBC AUTO: 41.5 FL (ref 37–54)
EOSINOPHIL # BLD AUTO: 0.01 10*3/MM3 (ref 0–0.7)
EOSINOPHIL NFR BLD AUTO: 0.1 % (ref 0–5)
ERYTHROCYTE [DISTWIDTH] IN BLOOD BY AUTOMATED COUNT: 13.1 % (ref 11.5–14.5)
GFR SERPL CREATININE-BSD FRML MDRD: 79 ML/MIN/1.73
GLOBULIN UR ELPH-MCNC: 3.1 GM/DL
GLUCOSE BLD-MCNC: 116 MG/DL (ref 70–110)
GLUCOSE UR STRIP-MCNC: NEGATIVE MG/DL
HCO3 BLDA-SCNC: 23.2 MMOL/L (ref 22–26)
HCT VFR BLD AUTO: 39.3 % (ref 37–47)
HCT VFR BLD CALC: 40 % (ref 37–47)
HGB BLD-MCNC: 13.3 G/DL (ref 12–16)
HGB BLDA-MCNC: 13.7 G/DL (ref 12–16)
HGB UR QL STRIP.AUTO: NEGATIVE
HOLD SPECIMEN: NORMAL
HOLD SPECIMEN: NORMAL
HOROWITZ INDEX BLD+IHG-RTO: 21 %
IMM GRANULOCYTES # BLD: 0.11 10*3/MM3 (ref 0–0.03)
IMM GRANULOCYTES NFR BLD: 0.6 % (ref 0–0.5)
KETONES UR QL STRIP: NEGATIVE
LEUKOCYTE ESTERASE UR QL STRIP.AUTO: NEGATIVE
LYMPHOCYTES # BLD AUTO: 3.81 10*3/MM3 (ref 1–3)
LYMPHOCYTES NFR BLD AUTO: 19.4 % (ref 21–51)
MCH RBC QN AUTO: 30 PG (ref 27–33)
MCHC RBC AUTO-ENTMCNC: 33.8 G/DL (ref 33–37)
MCV RBC AUTO: 88.7 FL (ref 80–94)
METHGB BLD QL: 0.3 % (ref 0–3)
MODALITY: ABNORMAL
MONOCYTES # BLD AUTO: 1.29 10*3/MM3 (ref 0.1–0.9)
MONOCYTES NFR BLD AUTO: 6.6 % (ref 0–10)
NEUTROPHILS # BLD AUTO: 14.39 10*3/MM3 (ref 1.4–6.5)
NEUTROPHILS NFR BLD AUTO: 73.2 % (ref 30–70)
NITRITE UR QL STRIP: NEGATIVE
OSMOLALITY SERPL CALC.SUM OF ELEC: 278.2 MOSM/KG (ref 273–305)
OXYHGB MFR BLDV: 93 % (ref 85–100)
PCO2 BLDA: 34.8 MM HG (ref 35–45)
PH BLDA: 7.44 PH UNITS (ref 7.35–7.45)
PH UR STRIP.AUTO: 5.5 [PH] (ref 5–8)
PLATELET # BLD AUTO: 364 10*3/MM3 (ref 130–400)
PMV BLD AUTO: 9.2 FL (ref 6–10)
PO2 BLDA: 77 MM HG (ref 80–100)
POTASSIUM BLD-SCNC: 4 MMOL/L (ref 3.5–5.3)
PROT SERPL-MCNC: 7.4 G/DL (ref 6–8)
PROT UR QL STRIP: NEGATIVE
RBC # BLD AUTO: 4.43 10*6/MM3 (ref 4.2–5.4)
SAO2 % BLDCOA: 95.9 % (ref 90–100)
SODIUM BLD-SCNC: 138 MMOL/L (ref 135–153)
SP GR UR STRIP: 1.02 (ref 1–1.03)
UROBILINOGEN UR QL STRIP: NORMAL
WBC NRBC COR # BLD: 19.63 10*3/MM3 (ref 4.5–12.5)
WHOLE BLOOD HOLD SPECIMEN: NORMAL
WHOLE BLOOD HOLD SPECIMEN: NORMAL

## 2017-09-25 PROCEDURE — 96365 THER/PROPH/DIAG IV INF INIT: CPT

## 2017-09-25 PROCEDURE — 82805 BLOOD GASES W/O2 SATURATION: CPT | Performed by: EMERGENCY MEDICINE

## 2017-09-25 PROCEDURE — 93010 ELECTROCARDIOGRAM REPORT: CPT | Performed by: INTERNAL MEDICINE

## 2017-09-25 PROCEDURE — 96375 TX/PRO/DX INJ NEW DRUG ADDON: CPT

## 2017-09-25 PROCEDURE — 80053 COMPREHEN METABOLIC PANEL: CPT | Performed by: EMERGENCY MEDICINE

## 2017-09-25 PROCEDURE — 87040 BLOOD CULTURE FOR BACTERIA: CPT | Performed by: EMERGENCY MEDICINE

## 2017-09-25 PROCEDURE — 25010000002 METHYLPREDNISOLONE PER 40 MG: Performed by: EMERGENCY MEDICINE

## 2017-09-25 PROCEDURE — 82375 ASSAY CARBOXYHB QUANT: CPT | Performed by: EMERGENCY MEDICINE

## 2017-09-25 PROCEDURE — 83050 HGB METHEMOGLOBIN QUAN: CPT | Performed by: EMERGENCY MEDICINE

## 2017-09-25 PROCEDURE — 71010 XR CHEST 1 VW: CPT | Performed by: RADIOLOGY

## 2017-09-25 PROCEDURE — 36600 WITHDRAWAL OF ARTERIAL BLOOD: CPT | Performed by: EMERGENCY MEDICINE

## 2017-09-25 PROCEDURE — 85025 COMPLETE CBC W/AUTO DIFF WBC: CPT | Performed by: EMERGENCY MEDICINE

## 2017-09-25 PROCEDURE — 93005 ELECTROCARDIOGRAM TRACING: CPT | Performed by: EMERGENCY MEDICINE

## 2017-09-25 PROCEDURE — 83605 ASSAY OF LACTIC ACID: CPT | Performed by: EMERGENCY MEDICINE

## 2017-09-25 PROCEDURE — 99284 EMERGENCY DEPT VISIT MOD MDM: CPT

## 2017-09-25 PROCEDURE — 71010 HC CHEST PA OR AP: CPT

## 2017-09-25 PROCEDURE — 81003 URINALYSIS AUTO W/O SCOPE: CPT | Performed by: EMERGENCY MEDICINE

## 2017-09-25 PROCEDURE — 25010000002 CEFTRIAXONE: Performed by: EMERGENCY MEDICINE

## 2017-09-25 RX ORDER — LEVOFLOXACIN 750 MG/1
750 TABLET ORAL DAILY
Qty: 7 TABLET | Refills: 0 | OUTPATIENT
Start: 2017-09-25 | End: 2021-09-02

## 2017-09-25 RX ORDER — SODIUM CHLORIDE 0.9 % (FLUSH) 0.9 %
10 SYRINGE (ML) INJECTION AS NEEDED
Status: DISCONTINUED | OUTPATIENT
Start: 2017-09-25 | End: 2017-09-25 | Stop reason: HOSPADM

## 2017-09-25 RX ORDER — METHYLPREDNISOLONE SODIUM SUCCINATE 40 MG/ML
80 INJECTION, POWDER, LYOPHILIZED, FOR SOLUTION INTRAMUSCULAR; INTRAVENOUS ONCE
Status: COMPLETED | OUTPATIENT
Start: 2017-09-25 | End: 2017-09-25

## 2017-09-25 RX ADMIN — METHYLPREDNISOLONE SODIUM SUCCINATE 80 MG: 40 INJECTION, POWDER, FOR SOLUTION INTRAMUSCULAR; INTRAVENOUS at 17:41

## 2017-09-25 RX ADMIN — CEFTRIAXONE 1 G: 1 INJECTION, POWDER, FOR SOLUTION INTRAMUSCULAR; INTRAVENOUS at 19:01

## 2017-09-28 LAB — BACTERIA SPEC AEROBE CULT: NORMAL

## 2017-09-29 LAB — BACTERIA SPEC AEROBE CULT: NORMAL

## 2017-09-30 LAB
BACTERIA SPEC AEROBE CULT: NORMAL
BACTERIA SPEC AEROBE CULT: NORMAL

## 2017-12-12 ENCOUNTER — APPOINTMENT (OUTPATIENT)
Dept: LAB | Facility: HOSPITAL | Age: 47
End: 2017-12-12

## 2017-12-12 ENCOUNTER — TRANSCRIBE ORDERS (OUTPATIENT)
Dept: ADMINISTRATIVE | Facility: HOSPITAL | Age: 47
End: 2017-12-12

## 2017-12-12 DIAGNOSIS — Z01.812 PRE-OPERATIVE LABORATORY EXAMINATION: ICD-10-CM

## 2017-12-12 DIAGNOSIS — G56.02 LEFT CARPAL TUNNEL SYNDROME: Primary | ICD-10-CM

## 2017-12-12 DIAGNOSIS — M79.609 PAIN IN EXTREMITY, UNSPECIFIED EXTREMITY: ICD-10-CM

## 2017-12-12 LAB
ANION GAP SERPL CALCULATED.3IONS-SCNC: 5.4 MMOL/L (ref 3.6–11.2)
BACTERIA UR QL AUTO: NORMAL /HPF
BASOPHILS # BLD AUTO: 0.05 10*3/MM3 (ref 0–0.3)
BASOPHILS NFR BLD AUTO: 0.5 % (ref 0–2)
BILIRUB UR QL STRIP: NEGATIVE
BUN BLD-MCNC: 8 MG/DL (ref 7–21)
BUN/CREAT SERPL: 9.1 (ref 7–25)
CALCIUM SPEC-SCNC: 9.8 MG/DL (ref 7.7–10)
CHLORIDE SERPL-SCNC: 105 MMOL/L (ref 99–112)
CLARITY UR: CLEAR
CO2 SERPL-SCNC: 27.6 MMOL/L (ref 24.3–31.9)
COLOR UR: YELLOW
CREAT BLD-MCNC: 0.88 MG/DL (ref 0.43–1.29)
DEPRECATED RDW RBC AUTO: 39.4 FL (ref 37–54)
EOSINOPHIL # BLD AUTO: 0.25 10*3/MM3 (ref 0–0.7)
EOSINOPHIL NFR BLD AUTO: 2.4 % (ref 0–5)
ERYTHROCYTE [DISTWIDTH] IN BLOOD BY AUTOMATED COUNT: 12.4 % (ref 11.5–14.5)
GFR SERPL CREATININE-BSD FRML MDRD: 70 ML/MIN/1.73
GLUCOSE BLD-MCNC: 107 MG/DL (ref 70–110)
GLUCOSE UR STRIP-MCNC: NEGATIVE MG/DL
HCT VFR BLD AUTO: 40.9 % (ref 37–47)
HGB BLD-MCNC: 14.7 G/DL (ref 12–16)
HGB UR QL STRIP.AUTO: NEGATIVE
HYALINE CASTS UR QL AUTO: NORMAL /LPF
IMM GRANULOCYTES # BLD: 0.02 10*3/MM3 (ref 0–0.03)
IMM GRANULOCYTES NFR BLD: 0.2 % (ref 0–0.5)
KETONES UR QL STRIP: NEGATIVE
LEUKOCYTE ESTERASE UR QL STRIP.AUTO: NEGATIVE
LYMPHOCYTES # BLD AUTO: 4.43 10*3/MM3 (ref 1–3)
LYMPHOCYTES NFR BLD AUTO: 43.2 % (ref 21–51)
MCH RBC QN AUTO: 31.9 PG (ref 27–33)
MCHC RBC AUTO-ENTMCNC: 35.9 G/DL (ref 33–37)
MCV RBC AUTO: 88.7 FL (ref 80–94)
MONOCYTES # BLD AUTO: 0.58 10*3/MM3 (ref 0.1–0.9)
MONOCYTES NFR BLD AUTO: 5.7 % (ref 0–10)
NEUTROPHILS # BLD AUTO: 4.92 10*3/MM3 (ref 1.4–6.5)
NEUTROPHILS NFR BLD AUTO: 48 % (ref 30–70)
NITRITE UR QL STRIP: NEGATIVE
OSMOLALITY SERPL CALC.SUM OF ELEC: 274.5 MOSM/KG (ref 273–305)
PH UR STRIP.AUTO: 6.5 [PH] (ref 5–8)
PLATELET # BLD AUTO: 305 10*3/MM3 (ref 130–400)
PMV BLD AUTO: 9.4 FL (ref 6–10)
POTASSIUM BLD-SCNC: 3.9 MMOL/L (ref 3.5–5.3)
PROT UR QL STRIP: NEGATIVE
RBC # BLD AUTO: 4.61 10*6/MM3 (ref 4.2–5.4)
RBC # UR: NORMAL /HPF
REF LAB TEST METHOD: NORMAL
SODIUM BLD-SCNC: 138 MMOL/L (ref 135–153)
SP GR UR STRIP: 1.02 (ref 1–1.03)
SQUAMOUS #/AREA URNS HPF: NORMAL /HPF
UROBILINOGEN UR QL STRIP: NORMAL
WBC NRBC COR # BLD: 10.25 10*3/MM3 (ref 4.5–12.5)
WBC UR QL AUTO: NORMAL /HPF

## 2017-12-12 PROCEDURE — 81001 URINALYSIS AUTO W/SCOPE: CPT | Performed by: NEUROLOGICAL SURGERY

## 2017-12-12 PROCEDURE — 36415 COLL VENOUS BLD VENIPUNCTURE: CPT | Performed by: NEUROLOGICAL SURGERY

## 2017-12-12 PROCEDURE — 80048 BASIC METABOLIC PNL TOTAL CA: CPT | Performed by: NEUROLOGICAL SURGERY

## 2017-12-12 PROCEDURE — 85025 COMPLETE CBC W/AUTO DIFF WBC: CPT | Performed by: NEUROLOGICAL SURGERY

## 2018-01-05 ENCOUNTER — APPOINTMENT (OUTPATIENT)
Dept: GENERAL RADIOLOGY | Facility: HOSPITAL | Age: 48
End: 2018-01-05

## 2018-01-05 ENCOUNTER — HOSPITAL ENCOUNTER (EMERGENCY)
Facility: HOSPITAL | Age: 48
Discharge: HOME | End: 2018-01-05
Attending: EMERGENCY MEDICINE | Admitting: EMERGENCY MEDICINE

## 2018-01-05 VITALS
TEMPERATURE: 98.2 F | SYSTOLIC BLOOD PRESSURE: 103 MMHG | HEART RATE: 103 BPM | RESPIRATION RATE: 20 BRPM | HEIGHT: 59 IN | BODY MASS INDEX: 38.3 KG/M2 | OXYGEN SATURATION: 100 % | DIASTOLIC BLOOD PRESSURE: 72 MMHG | WEIGHT: 190 LBS

## 2018-01-05 DIAGNOSIS — J45.901 MODERATE ASTHMA WITH ACUTE EXACERBATION, UNSPECIFIED WHETHER PERSISTENT: Primary | ICD-10-CM

## 2018-01-05 DIAGNOSIS — H66.92 LEFT OTITIS MEDIA, UNSPECIFIED OTITIS MEDIA TYPE: ICD-10-CM

## 2018-01-05 LAB
ALBUMIN SERPL-MCNC: 3.8 G/DL (ref 3.5–5)
ALBUMIN/GLOB SERPL: 1.3 G/DL (ref 1.5–2.5)
ALP SERPL-CCNC: 78 U/L (ref 35–104)
ALT SERPL W P-5'-P-CCNC: 50 U/L (ref 10–36)
ANION GAP SERPL CALCULATED.3IONS-SCNC: 5.6 MMOL/L (ref 3.6–11.2)
AST SERPL-CCNC: 32 U/L (ref 10–30)
BILIRUB SERPL-MCNC: 0.2 MG/DL (ref 0.2–1.8)
BUN BLD-MCNC: 10 MG/DL (ref 7–21)
BUN/CREAT SERPL: 11.2 (ref 7–25)
CALCIUM SPEC-SCNC: 8.9 MG/DL (ref 7.7–10)
CHLORIDE SERPL-SCNC: 106 MMOL/L (ref 99–112)
CO2 SERPL-SCNC: 26.4 MMOL/L (ref 24.3–31.9)
CREAT BLD-MCNC: 0.89 MG/DL (ref 0.43–1.29)
D-LACTATE SERPL-SCNC: 1.3 MMOL/L (ref 0.5–2)
DEPRECATED RDW RBC AUTO: 41.4 FL (ref 37–54)
ERYTHROCYTE [DISTWIDTH] IN BLOOD BY AUTOMATED COUNT: 12.8 % (ref 11.5–14.5)
GFR SERPL CREATININE-BSD FRML MDRD: 69 ML/MIN/1.73
GLOBULIN UR ELPH-MCNC: 3 GM/DL
GLUCOSE BLD-MCNC: 114 MG/DL (ref 70–110)
HCT VFR BLD AUTO: 40.2 % (ref 37–47)
HGB BLD-MCNC: 13.5 G/DL (ref 12–16)
HOLD SPECIMEN: NORMAL
HOLD SPECIMEN: NORMAL
MCH RBC QN AUTO: 30.3 PG (ref 27–33)
MCHC RBC AUTO-ENTMCNC: 33.6 G/DL (ref 33–37)
MCV RBC AUTO: 90.1 FL (ref 80–94)
OSMOLALITY SERPL CALC.SUM OF ELEC: 275.6 MOSM/KG (ref 273–305)
PLATELET # BLD AUTO: 308 10*3/MM3 (ref 130–400)
PMV BLD AUTO: 9.2 FL (ref 6–10)
POTASSIUM BLD-SCNC: 3.6 MMOL/L (ref 3.5–5.3)
PROT SERPL-MCNC: 6.8 G/DL (ref 6–8)
RBC # BLD AUTO: 4.46 10*6/MM3 (ref 4.2–5.4)
SCAN SLIDE: NORMAL
SODIUM BLD-SCNC: 138 MMOL/L (ref 135–153)
WBC NRBC COR # BLD: 8.41 10*3/MM3 (ref 4.5–12.5)
WHOLE BLOOD HOLD SPECIMEN: NORMAL
WHOLE BLOOD HOLD SPECIMEN: NORMAL

## 2018-01-05 PROCEDURE — 71045 X-RAY EXAM CHEST 1 VIEW: CPT

## 2018-01-05 PROCEDURE — 85007 BL SMEAR W/DIFF WBC COUNT: CPT | Performed by: EMERGENCY MEDICINE

## 2018-01-05 PROCEDURE — 80053 COMPREHEN METABOLIC PANEL: CPT | Performed by: EMERGENCY MEDICINE

## 2018-01-05 PROCEDURE — 87040 BLOOD CULTURE FOR BACTERIA: CPT | Performed by: EMERGENCY MEDICINE

## 2018-01-05 PROCEDURE — 71045 X-RAY EXAM CHEST 1 VIEW: CPT | Performed by: RADIOLOGY

## 2018-01-05 PROCEDURE — 83605 ASSAY OF LACTIC ACID: CPT | Performed by: EMERGENCY MEDICINE

## 2018-01-05 PROCEDURE — 99284 EMERGENCY DEPT VISIT MOD MDM: CPT

## 2018-01-05 PROCEDURE — 85025 COMPLETE CBC W/AUTO DIFF WBC: CPT | Performed by: EMERGENCY MEDICINE

## 2018-01-05 PROCEDURE — 94640 AIRWAY INHALATION TREATMENT: CPT

## 2018-01-05 PROCEDURE — 94799 UNLISTED PULMONARY SVC/PX: CPT

## 2018-01-05 RX ORDER — GUAIFENESIN AND CODEINE PHOSPHATE 100; 10 MG/5ML; MG/5ML
10 SOLUTION ORAL 4 TIMES DAILY PRN
Qty: 200 ML | Refills: 0 | OUTPATIENT
Start: 2018-01-05 | End: 2021-12-31

## 2018-01-05 RX ORDER — IPRATROPIUM BROMIDE AND ALBUTEROL SULFATE 2.5; .5 MG/3ML; MG/3ML
3 SOLUTION RESPIRATORY (INHALATION) ONCE
Status: COMPLETED | OUTPATIENT
Start: 2018-01-05 | End: 2018-01-05

## 2018-01-05 RX ORDER — SODIUM CHLORIDE 0.9 % (FLUSH) 0.9 %
10 SYRINGE (ML) INJECTION AS NEEDED
Status: DISCONTINUED | OUTPATIENT
Start: 2018-01-05 | End: 2018-01-06 | Stop reason: HOSPADM

## 2018-01-05 RX ORDER — CEPHALEXIN 500 MG/1
500 CAPSULE ORAL 2 TIMES DAILY
Qty: 20 CAPSULE | Refills: 0 | OUTPATIENT
Start: 2018-01-05 | End: 2021-12-31

## 2018-01-05 RX ORDER — METHYLPREDNISOLONE 4 MG/1
TABLET ORAL
Qty: 1 EACH | Refills: 0 | OUTPATIENT
Start: 2018-01-05 | End: 2021-12-31

## 2018-01-05 RX ADMIN — IPRATROPIUM BROMIDE AND ALBUTEROL SULFATE 3 ML: .5; 3 SOLUTION RESPIRATORY (INHALATION) at 21:35

## 2018-01-05 NOTE — ED NOTES
Pt states she started getting sick with cough and congestion on Sunday. Has progressively gotten worse. States shortness of breath started last night. Also reports left ear pain, chills, and sore throat.      Clarence Soler RN  01/05/18 2105

## 2018-01-06 LAB
EOSINOPHIL # BLD MANUAL: 0.17 10*3/MM3 (ref 0–0.7)
EOSINOPHIL NFR BLD MANUAL: 2 % (ref 0–5)
LYMPHOCYTES # BLD MANUAL: 2.78 10*3/MM3 (ref 1–3)
LYMPHOCYTES NFR BLD MANUAL: 33 % (ref 21–51)
LYMPHOCYTES NFR BLD MANUAL: 9 % (ref 0–10)
MONOCYTES # BLD AUTO: 0.76 10*3/MM3 (ref 0.1–0.9)
NEUTROPHILS # BLD AUTO: 4.71 10*3/MM3 (ref 1.4–6.5)
NEUTROPHILS NFR BLD MANUAL: 56 % (ref 30–70)
PLAT MORPH BLD: NORMAL
RBC MORPH BLD: NORMAL

## 2018-01-06 NOTE — ED NOTES
Patient reports being SOB or winded with activity and rest. Patient reports having congestion  And left ear pain for a few days. Patient states she has ran a fever off and on.  Patient resting quietly on stretcher. Pt AAOx4. No respiratory distress noted. Respirations even and unlabored. Pt denies any needs at this time. Skin PWD. Will continue to monitor and follow plan of care. Bed rails up x 2, bed in lowest position, call light within reach.      Felicita Higgins RN  01/05/18 2052

## 2018-01-06 NOTE — ED PROVIDER NOTES
Subjective   History of Present Illness  47-year-old white female complains of shortness of breath.  Patient states that she's had a 4 to five-day history of cough, wheezing, shortness of breath, left ear pain and fullness.  She had Rocephin and steroid injection at her PMD 4 days ago continues to have symptoms.  She denied any fever, chills, nausea, vomiting, diarrhea or other complaints.  Review of Systems   All other systems reviewed and are negative.      Past Medical History:   Diagnosis Date   • Abdominal pain    • Asthma    • Diabetes mellitus    • Hypertension    • Nausea        Allergies   Allergen Reactions   • Doxycycline    • Erythromycin    • Penicillins    • Sulfa Antibiotics        Past Surgical History:   Procedure Laterality Date   • BRONCHOSCOPY     • CARDIAC CATHETERIZATION     • CHOLECYSTECTOMY     • DENTAL PROCEDURE     • ENDOSCOPY N/A 2/6/2017    Procedure: ESOPHAGOGASTRODUODENOSCOPY WITH BIOPSY  CPTCODE:59913;  Surgeon: Sukhi Talley III, MD;  Location: [facility];  Service:    • HYSTERECTOMY      2006   • TONSILLECTOMY         Family History   Problem Relation Age of Onset   • Breast cancer Maternal Grandmother        Social History     Social History   • Marital status:      Spouse name: N/A   • Number of children: N/A   • Years of education: N/A     Social History Main Topics   • Smoking status: Current Every Day Smoker     Packs/day: 0.50     Years: 25.00     Types: Cigarettes   • Smokeless tobacco: Never Used   • Alcohol use No   • Drug use: No   • Sexual activity: Defer     Other Topics Concern   • None     Social History Narrative           Objective   Physical Exam   Constitutional: She is oriented to person, place, and time. She appears well-developed and well-nourished.   HENT:   Head: Normocephalic and atraumatic.   Right Ear: Tympanic membrane and external ear normal.   Left Ear: Tympanic membrane is injected, erythematous and bulging.   Cardiovascular: Normal rate and  regular rhythm.  Exam reveals no gallop and no friction rub.    No murmur heard.  Pulmonary/Chest: Effort normal. No respiratory distress. She has wheezes (Scattered). She has no rales.   Abdominal: Soft. Bowel sounds are normal. She exhibits no distension. There is no tenderness. There is no rebound and no guarding.   Musculoskeletal: Normal range of motion.   Neurological: She is alert and oriented to person, place, and time.   Skin: Skin is warm and dry.   Psychiatric: She has a normal mood and affect.   Nursing note and vitals reviewed.      Procedures  Results for orders placed or performed during the hospital encounter of 01/05/18   Comprehensive Metabolic Panel   Result Value Ref Range    Glucose 114 (H) 70 - 110 mg/dL    BUN 10 7 - 21 mg/dL    Creatinine 0.89 0.43 - 1.29 mg/dL    Sodium 138 135 - 153 mmol/L    Potassium 3.6 3.5 - 5.3 mmol/L    Chloride 106 99 - 112 mmol/L    CO2 26.4 24.3 - 31.9 mmol/L    Calcium 8.9 7.7 - 10.0 mg/dL    Total Protein 6.8 6.0 - 8.0 g/dL    Albumin 3.80 3.50 - 5.00 g/dL    ALT (SGPT) 50 (H) 10 - 36 U/L    AST (SGOT) 32 (H) 10 - 30 U/L    Alkaline Phosphatase 78 35 - 104 U/L    Total Bilirubin 0.2 0.2 - 1.8 mg/dL    eGFR Non African Amer 69 >60 mL/min/1.73    Globulin 3.0 gm/dL    A/G Ratio 1.3 (L) 1.5 - 2.5 g/dL    BUN/Creatinine Ratio 11.2 7.0 - 25.0    Anion Gap 5.6 3.6 - 11.2 mmol/L   Lactic Acid, Plasma   Result Value Ref Range    Lactate 1.3 0.5 - 2.0 mmol/L   CBC Auto Differential   Result Value Ref Range    WBC 8.41 4.50 - 12.50 10*3/mm3    RBC 4.46 4.20 - 5.40 10*6/mm3    Hemoglobin 13.5 12.0 - 16.0 g/dL    Hematocrit 40.2 37.0 - 47.0 %    MCV 90.1 80.0 - 94.0 fL    MCH 30.3 27.0 - 33.0 pg    MCHC 33.6 33.0 - 37.0 g/dL    RDW 12.8 11.5 - 14.5 %    RDW-SD 41.4 37.0 - 54.0 fl    MPV 9.2 6.0 - 10.0 fL    Platelets 308 130 - 400 10*3/mm3   Scan Slide   Result Value Ref Range    Scan Slide     Osmolality, Calculated   Result Value Ref Range    Osmolality Calc 275.6 273.0 -  305.0 mOsm/kg   Light Blue Top   Result Value Ref Range    Extra Tube hold for add-on    Green Top (Gel)   Result Value Ref Range    Extra Tube Hold for add-ons.    Lavender Top   Result Value Ref Range    Extra Tube hold for add-on    Gold Top - SST   Result Value Ref Range    Extra Tube Hold for add-ons.    Manual Differential   Result Value Ref Range    Neutrophil % 56.0 30.0 - 70.0 %    Lymphocyte % 33.0 21.0 - 51.0 %    Monocyte % 9.0 0.0 - 10.0 %    Eosinophil % 2.0 0.0 - 5.0 %    Neutrophils Absolute 4.71 1.40 - 6.50 10*3/mm3    Lymphocytes Absolute 2.78 1.00 - 3.00 10*3/mm3    Monocytes Absolute 0.76 0.10 - 0.90 10*3/mm3    Eosinophils Absolute 0.17 0.00 - 0.70 10*3/mm3    RBC Morphology Normal Normal    Platelet Morphology Normal Normal              ED Course  ED Course                  MDM  Number of Diagnoses or Management Options  Left otitis media, unspecified otitis media type:   Moderate asthma with acute exacerbation, unspecified whether persistent:      Amount and/or Complexity of Data Reviewed  Clinical lab tests: reviewed  Tests in the radiology section of CPT®: reviewed  Independent visualization of images, tracings, or specimens: yes    Risk of Complications, Morbidity, and/or Mortality  Presenting problems: moderate  Diagnostic procedures: moderate  Management options: moderate        Final diagnoses:   Moderate asthma with acute exacerbation, unspecified whether persistent   Left otitis media, unspecified otitis media type            Angel Fuentes MD  01/05/18 [id number]

## 2018-01-10 LAB
BACTERIA SPEC AEROBE CULT: NORMAL
BACTERIA SPEC AEROBE CULT: NORMAL

## 2021-03-16 ENCOUNTER — BULK ORDERING (OUTPATIENT)
Dept: CASE MANAGEMENT | Facility: OTHER | Age: 51
End: 2021-03-16

## 2021-03-16 DIAGNOSIS — Z23 IMMUNIZATION DUE: ICD-10-CM

## 2021-09-01 ENCOUNTER — HOSPITAL ENCOUNTER (OUTPATIENT)
Dept: GENERAL RADIOLOGY | Facility: HOSPITAL | Age: 51
Discharge: HOME | End: 2021-09-01
Admitting: FAMILY MEDICINE

## 2021-09-01 ENCOUNTER — TRANSCRIBE ORDERS (OUTPATIENT)
Dept: LAB | Facility: HOSPITAL | Age: 51
End: 2021-09-01

## 2021-09-01 DIAGNOSIS — J45.909 ALLERGIC ASTHMA WITH STATED CAUSE: ICD-10-CM

## 2021-09-01 DIAGNOSIS — J45.909 ALLERGIC ASTHMA WITH STATED CAUSE: Primary | ICD-10-CM

## 2021-09-01 PROCEDURE — 71046 X-RAY EXAM CHEST 2 VIEWS: CPT

## 2021-09-01 PROCEDURE — 71046 X-RAY EXAM CHEST 2 VIEWS: CPT | Performed by: RADIOLOGY

## 2021-12-31 ENCOUNTER — HOSPITAL ENCOUNTER (EMERGENCY)
Facility: HOSPITAL | Age: 51
Discharge: HOME | End: 2021-12-31
Attending: EMERGENCY MEDICINE | Admitting: EMERGENCY MEDICINE

## 2021-12-31 VITALS
RESPIRATION RATE: 18 BRPM | OXYGEN SATURATION: 99 % | DIASTOLIC BLOOD PRESSURE: 76 MMHG | WEIGHT: 22 LBS | SYSTOLIC BLOOD PRESSURE: 106 MMHG | BODY MASS INDEX: 4.44 KG/M2 | HEART RATE: 92 BPM | TEMPERATURE: 97.9 F | HEIGHT: 59 IN

## 2021-12-31 DIAGNOSIS — U07.1 COVID-19 VIRUS INFECTION: Primary | ICD-10-CM

## 2021-12-31 PROCEDURE — 25010000002 INJECTION, BAMLANIVIMAB AND ETESEVIMAB, 2100 MG: Performed by: PHYSICIAN ASSISTANT

## 2021-12-31 PROCEDURE — M0245 HC IV INFUSION, BAMLANIVIMAB AND ETESEVIMAB, 2100 MG: HCPCS | Performed by: PHYSICIAN ASSISTANT

## 2021-12-31 PROCEDURE — 99282 EMERGENCY DEPT VISIT SF MDM: CPT

## 2021-12-31 RX ORDER — SODIUM CHLORIDE 9 MG/ML
30 INJECTION, SOLUTION INTRAVENOUS ONCE
Status: COMPLETED | OUTPATIENT
Start: 2021-12-31 | End: 2021-12-31

## 2021-12-31 RX ORDER — DIPHENHYDRAMINE HYDROCHLORIDE 50 MG/ML
50 INJECTION INTRAMUSCULAR; INTRAVENOUS ONCE AS NEEDED
Status: DISCONTINUED | OUTPATIENT
Start: 2021-12-31 | End: 2021-12-31 | Stop reason: HOSPADM

## 2021-12-31 RX ORDER — EPINEPHRINE 1 MG/ML
0.3 INJECTION, SOLUTION INTRAMUSCULAR; SUBCUTANEOUS ONCE AS NEEDED
Status: DISCONTINUED | OUTPATIENT
Start: 2021-12-31 | End: 2021-12-31 | Stop reason: HOSPADM

## 2021-12-31 RX ORDER — METHYLPREDNISOLONE SODIUM SUCCINATE 125 MG/2ML
125 INJECTION, POWDER, LYOPHILIZED, FOR SOLUTION INTRAMUSCULAR; INTRAVENOUS ONCE AS NEEDED
Status: DISCONTINUED | OUTPATIENT
Start: 2021-12-31 | End: 2021-12-31 | Stop reason: HOSPADM

## 2021-12-31 RX ORDER — DIPHENHYDRAMINE HCL 50 MG
50 CAPSULE ORAL ONCE AS NEEDED
Status: DISCONTINUED | OUTPATIENT
Start: 2021-12-31 | End: 2021-12-31 | Stop reason: HOSPADM

## 2021-12-31 RX ADMIN — SODIUM CHLORIDE 30 ML: 900 INJECTION INTRAVENOUS at 17:00

## 2021-12-31 RX ADMIN — SODIUM CHLORIDE: 9 INJECTION, SOLUTION INTRAVENOUS at 15:51

## 2022-11-07 ENCOUNTER — HOSPITAL ENCOUNTER (OUTPATIENT)
Dept: GENERAL RADIOLOGY | Facility: HOSPITAL | Age: 52
Discharge: HOME | End: 2022-11-07

## 2022-11-07 ENCOUNTER — TRANSCRIBE ORDERS (OUTPATIENT)
Dept: LAB | Facility: HOSPITAL | Age: 52
End: 2022-11-07

## 2022-11-07 DIAGNOSIS — M25.561 RIGHT KNEE PAIN, UNSPECIFIED CHRONICITY: Primary | ICD-10-CM

## 2022-11-07 DIAGNOSIS — M25.562 LEFT KNEE PAIN, UNSPECIFIED CHRONICITY: ICD-10-CM

## 2022-11-07 DIAGNOSIS — M25.561 RIGHT KNEE PAIN, UNSPECIFIED CHRONICITY: ICD-10-CM

## 2022-11-07 DIAGNOSIS — M25.562 LEFT KNEE PAIN, UNSPECIFIED CHRONICITY: Primary | ICD-10-CM

## 2022-11-07 PROCEDURE — 73564 X-RAY EXAM KNEE 4 OR MORE: CPT

## 2022-11-07 PROCEDURE — 73564 X-RAY EXAM KNEE 4 OR MORE: CPT | Performed by: RADIOLOGY

## 2023-12-14 ENCOUNTER — TRANSCRIBE ORDERS (OUTPATIENT)
Dept: LAB | Facility: HOSPITAL | Age: 53
End: 2023-12-14
Payer: COMMERCIAL

## 2023-12-14 ENCOUNTER — LAB (OUTPATIENT)
Dept: LAB | Facility: HOSPITAL | Age: 53
End: 2023-12-14
Payer: COMMERCIAL

## 2023-12-14 DIAGNOSIS — M15.0 PRIMARY GENERALIZED HYPERTROPHIC OSTEOARTHROSIS: ICD-10-CM

## 2023-12-14 DIAGNOSIS — M05.79 RHEUMATOID ARTHRITIS INVOLVING MULTIPLE SITES WITH POSITIVE RHEUMATOID FACTOR: ICD-10-CM

## 2023-12-14 DIAGNOSIS — R53.82 CHRONIC FATIGUE: ICD-10-CM

## 2023-12-14 DIAGNOSIS — J84.9 INTERSTITIAL LUNG DISEASE: ICD-10-CM

## 2023-12-14 PROCEDURE — 85025 COMPLETE CBC W/AUTO DIFF WBC: CPT

## 2023-12-14 PROCEDURE — 80053 COMPREHEN METABOLIC PANEL: CPT

## 2023-12-14 PROCEDURE — 36415 COLL VENOUS BLD VENIPUNCTURE: CPT

## 2023-12-15 LAB
ALBUMIN SERPL-MCNC: 4.3 G/DL (ref 3.5–5.2)
ALBUMIN/GLOB SERPL: 1.5 G/DL
ALP SERPL-CCNC: 78 U/L (ref 39–117)
ALT SERPL W P-5'-P-CCNC: 44 U/L (ref 1–33)
ANION GAP SERPL CALCULATED.3IONS-SCNC: 12 MMOL/L (ref 5–15)
AST SERPL-CCNC: 36 U/L (ref 1–32)
BASOPHILS # BLD AUTO: 0.08 10*3/MM3 (ref 0–0.2)
BASOPHILS NFR BLD AUTO: 1 % (ref 0–1.5)
BILIRUB SERPL-MCNC: 0.7 MG/DL (ref 0–1.2)
BUN SERPL-MCNC: 11 MG/DL (ref 6–20)
BUN/CREAT SERPL: 12.4 (ref 7–25)
CALCIUM SPEC-SCNC: 9.2 MG/DL (ref 8.6–10.5)
CHLORIDE SERPL-SCNC: 99 MMOL/L (ref 98–107)
CO2 SERPL-SCNC: 25 MMOL/L (ref 22–29)
CREAT SERPL-MCNC: 0.89 MG/DL (ref 0.57–1)
DEPRECATED RDW RBC AUTO: 42.7 FL (ref 37–54)
EGFRCR SERPLBLD CKD-EPI 2021: 79.1 ML/MIN/1.73
EOSINOPHIL # BLD AUTO: 0.26 10*3/MM3 (ref 0–0.4)
EOSINOPHIL NFR BLD AUTO: 3.4 % (ref 0.3–6.2)
ERYTHROCYTE [DISTWIDTH] IN BLOOD BY AUTOMATED COUNT: 13 % (ref 12.3–15.4)
GLOBULIN UR ELPH-MCNC: 2.9 GM/DL
GLUCOSE SERPL-MCNC: 128 MG/DL (ref 65–99)
HCT VFR BLD AUTO: 42.5 % (ref 34–46.6)
HGB BLD-MCNC: 14.3 G/DL (ref 12–15.9)
IMM GRANULOCYTES # BLD AUTO: 0.02 10*3/MM3 (ref 0–0.05)
IMM GRANULOCYTES NFR BLD AUTO: 0.3 % (ref 0–0.5)
LYMPHOCYTES # BLD AUTO: 3.6 10*3/MM3 (ref 0.7–3.1)
LYMPHOCYTES NFR BLD AUTO: 47 % (ref 19.6–45.3)
MCH RBC QN AUTO: 30.4 PG (ref 26.6–33)
MCHC RBC AUTO-ENTMCNC: 33.6 G/DL (ref 31.5–35.7)
MCV RBC AUTO: 90.4 FL (ref 79–97)
MONOCYTES # BLD AUTO: 0.43 10*3/MM3 (ref 0.1–0.9)
MONOCYTES NFR BLD AUTO: 5.6 % (ref 5–12)
NEUTROPHILS NFR BLD AUTO: 3.27 10*3/MM3 (ref 1.7–7)
NEUTROPHILS NFR BLD AUTO: 42.7 % (ref 42.7–76)
NRBC BLD AUTO-RTO: 0.1 /100 WBC (ref 0–0.2)
PLATELET # BLD AUTO: 319 10*3/MM3 (ref 140–450)
PMV BLD AUTO: 9.4 FL (ref 6–12)
POTASSIUM SERPL-SCNC: 3.7 MMOL/L (ref 3.5–5.2)
PROT SERPL-MCNC: 7.2 G/DL (ref 6–8.5)
RBC # BLD AUTO: 4.7 10*6/MM3 (ref 3.77–5.28)
SODIUM SERPL-SCNC: 136 MMOL/L (ref 136–145)
WBC NRBC COR # BLD AUTO: 7.66 10*3/MM3 (ref 3.4–10.8)

## 2023-12-27 ENCOUNTER — HOSPITAL ENCOUNTER (OUTPATIENT)
Dept: GENERAL RADIOLOGY | Facility: HOSPITAL | Age: 53
Discharge: HOME | End: 2023-12-27
Admitting: FAMILY MEDICINE
Payer: COMMERCIAL

## 2023-12-27 ENCOUNTER — TRANSCRIBE ORDERS (OUTPATIENT)
Dept: LAB | Facility: HOSPITAL | Age: 53
End: 2023-12-27
Payer: COMMERCIAL

## 2023-12-27 DIAGNOSIS — G89.29 CHRONIC PAIN OF RIGHT KNEE: Primary | ICD-10-CM

## 2023-12-27 DIAGNOSIS — G89.29 CHRONIC PAIN OF RIGHT KNEE: ICD-10-CM

## 2023-12-27 DIAGNOSIS — M25.561 CHRONIC PAIN OF RIGHT KNEE: ICD-10-CM

## 2023-12-27 DIAGNOSIS — M25.561 CHRONIC PAIN OF RIGHT KNEE: Primary | ICD-10-CM

## 2023-12-27 PROCEDURE — 73564 X-RAY EXAM KNEE 4 OR MORE: CPT

## 2024-01-29 ENCOUNTER — LAB (OUTPATIENT)
Dept: LAB | Facility: HOSPITAL | Age: 54
End: 2024-01-29
Payer: COMMERCIAL

## 2024-01-29 ENCOUNTER — TRANSCRIBE ORDERS (OUTPATIENT)
Dept: LAB | Facility: HOSPITAL | Age: 54
End: 2024-01-29
Payer: COMMERCIAL

## 2024-01-29 DIAGNOSIS — M05.79 SEROPOSITIVE RHEUMATOID ARTHRITIS OF MULTIPLE SITES: ICD-10-CM

## 2024-01-29 DIAGNOSIS — M15.0 PRIMARY GENERALIZED HYPERTROPHIC OSTEOARTHROSIS: ICD-10-CM

## 2024-01-29 DIAGNOSIS — Z79.899 ENCOUNTER FOR LONG-TERM (CURRENT) USE OF OTHER MEDICATIONS: ICD-10-CM

## 2024-01-29 DIAGNOSIS — M15.0 PRIMARY GENERALIZED HYPERTROPHIC OSTEOARTHROSIS: Primary | ICD-10-CM

## 2024-01-29 PROCEDURE — 85025 COMPLETE CBC W/AUTO DIFF WBC: CPT

## 2024-01-29 PROCEDURE — 80053 COMPREHEN METABOLIC PANEL: CPT

## 2024-01-29 PROCEDURE — 36415 COLL VENOUS BLD VENIPUNCTURE: CPT

## 2024-01-30 LAB
ALBUMIN SERPL-MCNC: 4.2 G/DL (ref 3.5–5.2)
ALBUMIN/GLOB SERPL: 1.6 G/DL
ALP SERPL-CCNC: 84 U/L (ref 39–117)
ALT SERPL W P-5'-P-CCNC: 43 U/L (ref 1–33)
ANION GAP SERPL CALCULATED.3IONS-SCNC: 11 MMOL/L (ref 5–15)
AST SERPL-CCNC: 26 U/L (ref 1–32)
BASOPHILS # BLD AUTO: 0.08 10*3/MM3 (ref 0–0.2)
BASOPHILS NFR BLD AUTO: 0.8 % (ref 0–1.5)
BILIRUB SERPL-MCNC: 0.5 MG/DL (ref 0–1.2)
BUN SERPL-MCNC: 9 MG/DL (ref 6–20)
BUN/CREAT SERPL: 10.2 (ref 7–25)
CALCIUM SPEC-SCNC: 9.5 MG/DL (ref 8.6–10.5)
CHLORIDE SERPL-SCNC: 100 MMOL/L (ref 98–107)
CO2 SERPL-SCNC: 28 MMOL/L (ref 22–29)
CREAT SERPL-MCNC: 0.88 MG/DL (ref 0.57–1)
DEPRECATED RDW RBC AUTO: 45.4 FL (ref 37–54)
EGFRCR SERPLBLD CKD-EPI 2021: 79.7 ML/MIN/1.73
EOSINOPHIL # BLD AUTO: 0.29 10*3/MM3 (ref 0–0.4)
EOSINOPHIL NFR BLD AUTO: 2.8 % (ref 0.3–6.2)
ERYTHROCYTE [DISTWIDTH] IN BLOOD BY AUTOMATED COUNT: 13.5 % (ref 12.3–15.4)
GLOBULIN UR ELPH-MCNC: 2.7 GM/DL
GLUCOSE SERPL-MCNC: 129 MG/DL (ref 65–99)
HCT VFR BLD AUTO: 46.3 % (ref 34–46.6)
HGB BLD-MCNC: 15.6 G/DL (ref 12–15.9)
IMM GRANULOCYTES # BLD AUTO: 0.04 10*3/MM3 (ref 0–0.05)
IMM GRANULOCYTES NFR BLD AUTO: 0.4 % (ref 0–0.5)
LYMPHOCYTES # BLD AUTO: 3.69 10*3/MM3 (ref 0.7–3.1)
LYMPHOCYTES NFR BLD AUTO: 35.8 % (ref 19.6–45.3)
MCH RBC QN AUTO: 31 PG (ref 26.6–33)
MCHC RBC AUTO-ENTMCNC: 33.7 G/DL (ref 31.5–35.7)
MCV RBC AUTO: 92 FL (ref 79–97)
MONOCYTES # BLD AUTO: 0.61 10*3/MM3 (ref 0.1–0.9)
MONOCYTES NFR BLD AUTO: 5.9 % (ref 5–12)
NEUTROPHILS NFR BLD AUTO: 5.61 10*3/MM3 (ref 1.7–7)
NEUTROPHILS NFR BLD AUTO: 54.3 % (ref 42.7–76)
NRBC BLD AUTO-RTO: 0 /100 WBC (ref 0–0.2)
PLATELET # BLD AUTO: 344 10*3/MM3 (ref 140–450)
PMV BLD AUTO: 9.3 FL (ref 6–12)
POTASSIUM SERPL-SCNC: 3.6 MMOL/L (ref 3.5–5.2)
PROT SERPL-MCNC: 6.9 G/DL (ref 6–8.5)
RBC # BLD AUTO: 5.03 10*6/MM3 (ref 3.77–5.28)
SODIUM SERPL-SCNC: 139 MMOL/L (ref 136–145)
WBC NRBC COR # BLD AUTO: 10.32 10*3/MM3 (ref 3.4–10.8)

## 2024-03-19 ENCOUNTER — TRANSCRIBE ORDERS (OUTPATIENT)
Dept: LAB | Facility: HOSPITAL | Age: 54
End: 2024-03-19
Payer: COMMERCIAL

## 2024-03-19 ENCOUNTER — LAB (OUTPATIENT)
Dept: LAB | Facility: HOSPITAL | Age: 54
End: 2024-03-19
Payer: COMMERCIAL

## 2024-03-19 DIAGNOSIS — Z79.899 ENCOUNTER FOR LONG-TERM (CURRENT) USE OF OTHER MEDICATIONS: ICD-10-CM

## 2024-03-19 DIAGNOSIS — M05.79 SEROPOSITIVE RHEUMATOID ARTHRITIS OF MULTIPLE SITES: ICD-10-CM

## 2024-03-19 DIAGNOSIS — M15.0 PRIMARY GENERALIZED HYPERTROPHIC OSTEOARTHROSIS: Primary | ICD-10-CM

## 2024-03-19 DIAGNOSIS — M15.0 PRIMARY GENERALIZED HYPERTROPHIC OSTEOARTHROSIS: ICD-10-CM

## 2024-03-19 PROCEDURE — 80053 COMPREHEN METABOLIC PANEL: CPT

## 2024-03-19 PROCEDURE — 85025 COMPLETE CBC W/AUTO DIFF WBC: CPT

## 2024-03-19 PROCEDURE — 36415 COLL VENOUS BLD VENIPUNCTURE: CPT

## 2024-03-20 LAB
ALBUMIN SERPL-MCNC: 4.3 G/DL (ref 3.5–5.2)
ALBUMIN/GLOB SERPL: 1.4 G/DL
ALP SERPL-CCNC: 89 U/L (ref 39–117)
ALT SERPL W P-5'-P-CCNC: 59 U/L (ref 1–33)
ANION GAP SERPL CALCULATED.3IONS-SCNC: 8 MMOL/L (ref 5–15)
AST SERPL-CCNC: 52 U/L (ref 1–32)
BASOPHILS # BLD AUTO: 0.1 10*3/MM3 (ref 0–0.2)
BASOPHILS NFR BLD AUTO: 1 % (ref 0–1.5)
BILIRUB SERPL-MCNC: 0.7 MG/DL (ref 0–1.2)
BUN SERPL-MCNC: 11 MG/DL (ref 6–20)
BUN/CREAT SERPL: 11.1 (ref 7–25)
CALCIUM SPEC-SCNC: 9.1 MG/DL (ref 8.6–10.5)
CHLORIDE SERPL-SCNC: 103 MMOL/L (ref 98–107)
CO2 SERPL-SCNC: 27 MMOL/L (ref 22–29)
CREAT SERPL-MCNC: 0.99 MG/DL (ref 0.57–1)
DEPRECATED RDW RBC AUTO: 41.9 FL (ref 37–54)
EGFRCR SERPLBLD CKD-EPI 2021: 69.2 ML/MIN/1.73
EOSINOPHIL # BLD AUTO: 0.38 10*3/MM3 (ref 0–0.4)
EOSINOPHIL NFR BLD AUTO: 3.6 % (ref 0.3–6.2)
ERYTHROCYTE [DISTWIDTH] IN BLOOD BY AUTOMATED COUNT: 12.3 % (ref 12.3–15.4)
GLOBULIN UR ELPH-MCNC: 3.1 GM/DL
GLUCOSE SERPL-MCNC: 125 MG/DL (ref 65–99)
HCT VFR BLD AUTO: 44.6 % (ref 34–46.6)
HGB BLD-MCNC: 14.9 G/DL (ref 12–15.9)
IMM GRANULOCYTES # BLD AUTO: 0.03 10*3/MM3 (ref 0–0.05)
IMM GRANULOCYTES NFR BLD AUTO: 0.3 % (ref 0–0.5)
LYMPHOCYTES # BLD AUTO: 4.23 10*3/MM3 (ref 0.7–3.1)
LYMPHOCYTES NFR BLD AUTO: 40.4 % (ref 19.6–45.3)
MCH RBC QN AUTO: 30.9 PG (ref 26.6–33)
MCHC RBC AUTO-ENTMCNC: 33.4 G/DL (ref 31.5–35.7)
MCV RBC AUTO: 92.5 FL (ref 79–97)
MONOCYTES # BLD AUTO: 0.61 10*3/MM3 (ref 0.1–0.9)
MONOCYTES NFR BLD AUTO: 5.8 % (ref 5–12)
NEUTROPHILS NFR BLD AUTO: 48.9 % (ref 42.7–76)
NEUTROPHILS NFR BLD AUTO: 5.12 10*3/MM3 (ref 1.7–7)
NRBC BLD AUTO-RTO: 0 /100 WBC (ref 0–0.2)
PLATELET # BLD AUTO: 339 10*3/MM3 (ref 140–450)
PMV BLD AUTO: 9.6 FL (ref 6–12)
POTASSIUM SERPL-SCNC: 4 MMOL/L (ref 3.5–5.2)
PROT SERPL-MCNC: 7.4 G/DL (ref 6–8.5)
RBC # BLD AUTO: 4.82 10*6/MM3 (ref 3.77–5.28)
SODIUM SERPL-SCNC: 138 MMOL/L (ref 136–145)
WBC NRBC COR # BLD AUTO: 10.47 10*3/MM3 (ref 3.4–10.8)

## 2024-04-29 ENCOUNTER — OFFICE VISIT (OUTPATIENT)
Dept: SURGERY | Facility: CLINIC | Age: 54
End: 2024-04-29
Payer: COMMERCIAL

## 2024-04-29 VITALS
SYSTOLIC BLOOD PRESSURE: 156 MMHG | HEART RATE: 84 BPM | HEIGHT: 59 IN | BODY MASS INDEX: 48.54 KG/M2 | WEIGHT: 240.8 LBS | DIASTOLIC BLOOD PRESSURE: 98 MMHG

## 2024-04-29 DIAGNOSIS — L02.213 CUTANEOUS ABSCESS OF CHEST WALL: Primary | ICD-10-CM

## 2024-04-29 PROCEDURE — 99202 OFFICE O/P NEW SF 15 MIN: CPT | Performed by: SURGERY

## 2024-04-29 RX ORDER — ONDANSETRON 4 MG/1
TABLET, FILM COATED ORAL
COMMUNITY

## 2024-04-29 RX ORDER — HYDROCODONE BITARTRATE AND ACETAMINOPHEN 5; 325 MG/1; MG/1
1 TABLET ORAL
COMMUNITY
Start: 2024-04-12

## 2024-04-29 RX ORDER — MELOXICAM 15 MG/1
15 TABLET ORAL DAILY
COMMUNITY
Start: 2024-01-22

## 2024-04-29 RX ORDER — METHOTREXATE 2.5 MG/1
TABLET ORAL
COMMUNITY
Start: 2024-01-26

## 2024-04-29 RX ORDER — BROMPHENIRAMINE MALEATE, PSEUDOEPHEDRINE HYDROCHLORIDE, AND DEXTROMETHORPHAN HYDROBROMIDE 2; 30; 10 MG/5ML; MG/5ML; MG/5ML
5 SYRUP ORAL
COMMUNITY
Start: 2024-04-22 | End: 2024-05-02

## 2024-04-29 RX ORDER — FOLIC ACID 1 MG/1
TABLET ORAL
COMMUNITY
Start: 2024-01-26

## 2024-04-29 RX ORDER — BUDESONIDE, GLYCOPYRROLATE, AND FORMOTEROL FUMARATE 160; 9; 4.8 UG/1; UG/1; UG/1
2 AEROSOL, METERED RESPIRATORY (INHALATION)
COMMUNITY
Start: 2024-02-12

## 2024-04-29 RX ORDER — BENZONATATE 200 MG/1
CAPSULE ORAL
COMMUNITY
Start: 2024-01-22

## 2024-04-29 RX ORDER — ALBUTEROL SULFATE 2.5 MG/3ML
SOLUTION RESPIRATORY (INHALATION)
COMMUNITY
Start: 2024-01-22

## 2024-04-29 RX ORDER — CETIRIZINE HYDROCHLORIDE 10 MG/1
TABLET ORAL
COMMUNITY
Start: 2023-11-09

## 2024-04-29 RX ORDER — ROFLUMILAST 250 UG/1
250 TABLET ORAL DAILY
COMMUNITY
Start: 2024-02-12

## 2024-04-29 RX ORDER — PREDNISONE 10 MG/1
10 TABLET ORAL DAILY PRN
COMMUNITY

## 2024-04-29 NOTE — PROGRESS NOTES
Subjective   Albertina Verma is a 53 y.o. female here today for mammogram review.    History of Present Illness  Ms. Verma was seen in the office today for breast evaluation.  She underwent a bilateral screening mammogram on 3/28/2024 which demonstrated no suspicious findings.  Patient denies a palpable mass or nipple discharge.  There is no prior history of breast biopsy or cyst aspiration.  Family history is positive for breast cancer in the maternal grandmother.  Patient is perimenopausal.  Patient states she is not sure why she was advised to have a surgical consult.  She does report a recurrent skin infection in the right breast in the 5:00 area which has been recurring for several months.  Allergies   Allergen Reactions    Doxycycline     Erythromycin     Penicillins     Sulfa Antibiotics      Current Outpatient Medications   Medication Sig Dispense Refill    albuterol (PROVENTIL HFA;VENTOLIN HFA) 108 (90 Base) MCG/ACT inhaler Inhale 2 puffs Every 4 (Four) Hours As Needed for Wheezing or Shortness of Air. 1 inhaler 0    albuterol (PROVENTIL) (2.5 MG/3ML) 0.083% nebulizer solution INHALE CONTENTS OF 1 VIAL VIA NEBULIZER EVERY 4 TO 6 HOURS AS NEEDED FOR WHEEZING      benzonatate (TESSALON) 200 MG capsule       brompheniramine-pseudoephedrine-DM 30-2-10 MG/5ML syrup Take 5 mL by mouth.      Budeson-Glycopyrrol-Formoterol (Breztri Aerosphere) 160-9-4.8 MCG/ACT aerosol inhaler Inhale 2 puffs.      cetirizine (zyrTEC) 10 MG tablet TAKE 1 TABLET BY MOUTH ONCE A DAY FOR ALLERGIES      folic acid (FOLVITE) 1 MG tablet       HYDROcodone-acetaminophen (NORCO) 5-325 MG per tablet Take 1 tablet by mouth.      ibuprofen (ADVIL,MOTRIN) 800 MG tablet Take 1 tablet by mouth Every 6 (Six) Hours As Needed for Mild Pain.      meloxicam (MOBIC) 15 MG tablet Take 1 tablet by mouth Daily.      methotrexate 2.5 MG tablet       ondansetron (ZOFRAN) 4 MG tablet TAKE 1 TABLET BY MOUTH EVERY DAY AS NEEDED FOR NAUSEA      predniSONE  (DELTASONE) 10 MG tablet Take 1 tablet by mouth Daily As Needed.      roflumilast (DALIRESP) 250 MCG tablet tablet Take 1 tablet by mouth Daily.       No current facility-administered medications for this visit.     Past Medical History:   Diagnosis Date    Abdominal pain     Asthma     Diabetes mellitus     Hypertension     Nausea      Past Surgical History:   Procedure Laterality Date    BRONCHOSCOPY      CARDIAC CATHETERIZATION      CHOLECYSTECTOMY      DENTAL PROCEDURE      ENDOSCOPY N/A 2/6/2017    Procedure: ESOPHAGOGASTRODUODENOSCOPY WITH BIOPSY  CPTCODE:12068;  Surgeon: Sukhi Talley III, MD;  Location: [facility];  Service:     HYSTERECTOMY      2006    TONSILLECTOMY         Pertinent Review of Systems:  Respiratory: no shortness of breath  Cardiovascular: no chest pain  Other pertinent:      Objective   /98 (BP Location: Left arm)   Pulse 84   Wt 109 kg (240 lb 12.8 oz)   BMI 48.64 kg/m²   Physical Exam  Well-developed well-nourished female  Breasts: On visual inspection the breasts are large and pendulous.  Examination of the right breast demonstrates no discrete mass, skin change, or axillary adenopathy examination of the left breast demonstrates no discrete mass, skin change, or axillary adenopathy  Skin: On the skin of the right breast in the 5 o'clock position is a 1.7 cm chronic abscess without surrounding cellulitis mammogram reports and images from 3/28/2024 were reviewed.  Procedures     Results/Data:  Imaging: I agree with the assessment      Assessment & Plan   Recurrent infection of skin of right breast    Patient will be given a follow-up appointment to return for office excision         Discussion/Summary    Time spent:            No future appointments.      Please note that portions of this note were completed with a voice recognition program.

## 2024-05-06 ENCOUNTER — PROCEDURE VISIT (OUTPATIENT)
Dept: SURGERY | Facility: CLINIC | Age: 54
End: 2024-05-06
Payer: COMMERCIAL

## 2024-05-06 VITALS
DIASTOLIC BLOOD PRESSURE: 74 MMHG | SYSTOLIC BLOOD PRESSURE: 156 MMHG | HEIGHT: 59 IN | BODY MASS INDEX: 48.26 KG/M2 | WEIGHT: 239.4 LBS | HEART RATE: 54 BPM

## 2024-05-06 DIAGNOSIS — L02.213 CUTANEOUS ABSCESS OF CHEST WALL: Primary | ICD-10-CM

## 2024-05-06 PROCEDURE — 12032 INTMD RPR S/A/T/EXT 2.6-7.5: CPT | Performed by: SURGERY

## 2024-05-06 PROCEDURE — 11404 EXC TR-EXT B9+MARG 3.1-4 CM: CPT | Performed by: SURGERY

## 2024-05-13 ENCOUNTER — OFFICE VISIT (OUTPATIENT)
Dept: SURGERY | Facility: CLINIC | Age: 54
End: 2024-05-13
Payer: COMMERCIAL

## 2024-05-13 VITALS
BODY MASS INDEX: 47.98 KG/M2 | HEIGHT: 59 IN | WEIGHT: 238 LBS | DIASTOLIC BLOOD PRESSURE: 92 MMHG | HEART RATE: 84 BPM | SYSTOLIC BLOOD PRESSURE: 145 MMHG

## 2024-05-13 DIAGNOSIS — Z09 POSTOP CHECK: ICD-10-CM

## 2024-05-13 DIAGNOSIS — L02.213 CUTANEOUS ABSCESS OF CHEST WALL: Primary | ICD-10-CM

## 2024-05-13 PROCEDURE — 99024 POSTOP FOLLOW-UP VISIT: CPT | Performed by: SURGERY

## 2024-05-13 NOTE — PROGRESS NOTES
"Subjective   Albertina Verma is a 53 y.o. female here today for suture removal.    History of Present Illness  Ms. Verma was seen in the office today for suture removal following excision of a cutaneous abscess of the left breast on 5/6/2024  Allergies   Allergen Reactions    Doxycycline     Erythromycin     Penicillins     Sulfa Antibiotics          Current Outpatient Medications   Medication Sig Dispense Refill    albuterol (PROVENTIL HFA;VENTOLIN HFA) 108 (90 Base) MCG/ACT inhaler Inhale 2 puffs Every 4 (Four) Hours As Needed for Wheezing or Shortness of Air. 1 inhaler 0    albuterol (PROVENTIL) (2.5 MG/3ML) 0.083% nebulizer solution INHALE CONTENTS OF 1 VIAL VIA NEBULIZER EVERY 4 TO 6 HOURS AS NEEDED FOR WHEEZING      benzonatate (TESSALON) 200 MG capsule       Budeson-Glycopyrrol-Formoterol (Breztri Aerosphere) 160-9-4.8 MCG/ACT aerosol inhaler Inhale 2 puffs.      cetirizine (zyrTEC) 10 MG tablet TAKE 1 TABLET BY MOUTH ONCE A DAY FOR ALLERGIES      folic acid (FOLVITE) 1 MG tablet       ibuprofen (ADVIL,MOTRIN) 800 MG tablet Take 1 tablet by mouth Every 6 (Six) Hours As Needed for Mild Pain.      meloxicam (MOBIC) 15 MG tablet Take 1 tablet by mouth Daily.      methotrexate 2.5 MG tablet       ondansetron (ZOFRAN) 4 MG tablet TAKE 1 TABLET BY MOUTH EVERY DAY AS NEEDED FOR NAUSEA      predniSONE (DELTASONE) 10 MG tablet Take 1 tablet by mouth Daily As Needed.      roflumilast (DALIRESP) 250 MCG tablet tablet Take 1 tablet by mouth Daily.      HYDROcodone-acetaminophen (NORCO) 5-325 MG per tablet Take 1 tablet by mouth. (Patient not taking: Reported on 5/13/2024)       No current facility-administered medications for this visit.       Objective   /92 (BP Location: Left arm)   Pulse 84   Ht 149.9 cm (59\")   Wt 108 kg (238 lb)   BMI 48.07 kg/m²    Physical Exam  Right breast: Healing incision in the 5 o'clock position.  Sutures were removed without complication  Results/Data      Procedures     Assessment " & Plan   Status post excision of right breast skin infection    Follow-up as needed       Discussion/Summary    Class 3 Severe Obesity (BMI >=40). Obesity-related health conditions include the following: hypertension. Obesity is improving with lifestyle modifications. BMI is is above average; no BMI management plan is appropriate. We discussed portion control and increasing exercise.       No future appointments.      Please note that portions of this note were completed with a voice recognition program.

## 2024-05-15 ENCOUNTER — LAB (OUTPATIENT)
Dept: LAB | Facility: HOSPITAL | Age: 54
End: 2024-05-15
Payer: COMMERCIAL

## 2024-05-15 DIAGNOSIS — M15.0 PRIMARY GENERALIZED HYPERTROPHIC OSTEOARTHROSIS: ICD-10-CM

## 2024-05-15 DIAGNOSIS — Z79.899 ENCOUNTER FOR LONG-TERM (CURRENT) USE OF OTHER MEDICATIONS: ICD-10-CM

## 2024-05-15 DIAGNOSIS — M05.79 SEROPOSITIVE RHEUMATOID ARTHRITIS OF MULTIPLE SITES: ICD-10-CM

## 2024-05-15 PROCEDURE — 80053 COMPREHEN METABOLIC PANEL: CPT

## 2024-05-15 PROCEDURE — 85025 COMPLETE CBC W/AUTO DIFF WBC: CPT

## 2024-05-15 PROCEDURE — 36415 COLL VENOUS BLD VENIPUNCTURE: CPT

## 2024-05-16 LAB
ALBUMIN SERPL-MCNC: 4 G/DL (ref 3.5–5.2)
ALBUMIN/GLOB SERPL: 1.3 G/DL
ALP SERPL-CCNC: 84 U/L (ref 39–117)
ALT SERPL W P-5'-P-CCNC: 46 U/L (ref 1–33)
ANION GAP SERPL CALCULATED.3IONS-SCNC: 10.6 MMOL/L (ref 5–15)
AST SERPL-CCNC: 38 U/L (ref 1–32)
BASOPHILS # BLD AUTO: 0.07 10*3/MM3 (ref 0–0.2)
BASOPHILS NFR BLD AUTO: 0.7 % (ref 0–1.5)
BILIRUB SERPL-MCNC: 0.6 MG/DL (ref 0–1.2)
BUN SERPL-MCNC: 6 MG/DL (ref 6–20)
BUN/CREAT SERPL: 8.1 (ref 7–25)
CALCIUM SPEC-SCNC: 9.1 MG/DL (ref 8.6–10.5)
CHLORIDE SERPL-SCNC: 107 MMOL/L (ref 98–107)
CO2 SERPL-SCNC: 22.4 MMOL/L (ref 22–29)
CREAT SERPL-MCNC: 0.74 MG/DL (ref 0.57–1)
DEPRECATED RDW RBC AUTO: 37.9 FL (ref 37–54)
EGFRCR SERPLBLD CKD-EPI 2021: 98.1 ML/MIN/1.73
EOSINOPHIL # BLD AUTO: 0.34 10*3/MM3 (ref 0–0.4)
EOSINOPHIL NFR BLD AUTO: 3.5 % (ref 0.3–6.2)
ERYTHROCYTE [DISTWIDTH] IN BLOOD BY AUTOMATED COUNT: 11.9 % (ref 12.3–15.4)
GLOBULIN UR ELPH-MCNC: 3.2 GM/DL
GLUCOSE SERPL-MCNC: 109 MG/DL (ref 65–99)
HCT VFR BLD AUTO: 43.7 % (ref 34–46.6)
HGB BLD-MCNC: 14.5 G/DL (ref 12–15.9)
IMM GRANULOCYTES # BLD AUTO: 0.02 10*3/MM3 (ref 0–0.05)
IMM GRANULOCYTES NFR BLD AUTO: 0.2 % (ref 0–0.5)
LYMPHOCYTES # BLD AUTO: 4.67 10*3/MM3 (ref 0.7–3.1)
LYMPHOCYTES NFR BLD AUTO: 48.4 % (ref 19.6–45.3)
MCH RBC QN AUTO: 29.6 PG (ref 26.6–33)
MCHC RBC AUTO-ENTMCNC: 33.2 G/DL (ref 31.5–35.7)
MCV RBC AUTO: 89.2 FL (ref 79–97)
MONOCYTES # BLD AUTO: 0.61 10*3/MM3 (ref 0.1–0.9)
MONOCYTES NFR BLD AUTO: 6.3 % (ref 5–12)
NEUTROPHILS NFR BLD AUTO: 3.93 10*3/MM3 (ref 1.7–7)
NEUTROPHILS NFR BLD AUTO: 40.9 % (ref 42.7–76)
NRBC BLD AUTO-RTO: 0 /100 WBC (ref 0–0.2)
PLATELET # BLD AUTO: 366 10*3/MM3 (ref 140–450)
PMV BLD AUTO: 9.2 FL (ref 6–12)
POTASSIUM SERPL-SCNC: 4.2 MMOL/L (ref 3.5–5.2)
PROT SERPL-MCNC: 7.2 G/DL (ref 6–8.5)
RBC # BLD AUTO: 4.9 10*6/MM3 (ref 3.77–5.28)
SODIUM SERPL-SCNC: 140 MMOL/L (ref 136–145)
WBC NRBC COR # BLD AUTO: 9.64 10*3/MM3 (ref 3.4–10.8)

## 2024-06-11 ENCOUNTER — LAB (OUTPATIENT)
Dept: LAB | Facility: HOSPITAL | Age: 54
End: 2024-06-11
Payer: COMMERCIAL

## 2024-06-11 DIAGNOSIS — Z79.899 ENCOUNTER FOR LONG-TERM (CURRENT) USE OF OTHER MEDICATIONS: ICD-10-CM

## 2024-06-11 DIAGNOSIS — M05.79 SEROPOSITIVE RHEUMATOID ARTHRITIS OF MULTIPLE SITES: ICD-10-CM

## 2024-06-11 DIAGNOSIS — M15.0 PRIMARY GENERALIZED HYPERTROPHIC OSTEOARTHROSIS: ICD-10-CM

## 2024-06-11 PROCEDURE — 36415 COLL VENOUS BLD VENIPUNCTURE: CPT

## 2024-06-11 PROCEDURE — 80053 COMPREHEN METABOLIC PANEL: CPT

## 2024-06-12 LAB
ALBUMIN SERPL-MCNC: 4.1 G/DL (ref 3.5–5.2)
ALBUMIN/GLOB SERPL: 1.3 G/DL
ALP SERPL-CCNC: 80 U/L (ref 39–117)
ALT SERPL W P-5'-P-CCNC: 70 U/L (ref 1–33)
ANION GAP SERPL CALCULATED.3IONS-SCNC: 8.9 MMOL/L (ref 5–15)
AST SERPL-CCNC: 61 U/L (ref 1–32)
BILIRUB SERPL-MCNC: 0.4 MG/DL (ref 0–1.2)
BUN SERPL-MCNC: 12 MG/DL (ref 6–20)
BUN/CREAT SERPL: 14.5 (ref 7–25)
CALCIUM SPEC-SCNC: 9.1 MG/DL (ref 8.6–10.5)
CHLORIDE SERPL-SCNC: 101 MMOL/L (ref 98–107)
CO2 SERPL-SCNC: 27.1 MMOL/L (ref 22–29)
CREAT SERPL-MCNC: 0.83 MG/DL (ref 0.57–1)
EGFRCR SERPLBLD CKD-EPI 2021: 85.5 ML/MIN/1.73
GLOBULIN UR ELPH-MCNC: 3.1 GM/DL
GLUCOSE SERPL-MCNC: 119 MG/DL (ref 65–99)
POTASSIUM SERPL-SCNC: 3.9 MMOL/L (ref 3.5–5.2)
PROT SERPL-MCNC: 7.2 G/DL (ref 6–8.5)
SODIUM SERPL-SCNC: 137 MMOL/L (ref 136–145)

## 2024-07-11 ENCOUNTER — LAB (OUTPATIENT)
Dept: LAB | Facility: HOSPITAL | Age: 54
End: 2024-07-11
Payer: COMMERCIAL

## 2024-07-11 ENCOUNTER — TRANSCRIBE ORDERS (OUTPATIENT)
Dept: LAB | Facility: HOSPITAL | Age: 54
End: 2024-07-11
Payer: COMMERCIAL

## 2024-07-11 DIAGNOSIS — Z79.899 ENCOUNTER FOR LONG-TERM (CURRENT) USE OF OTHER MEDICATIONS: Primary | ICD-10-CM

## 2024-07-11 DIAGNOSIS — M05.79 SEROPOSITIVE RHEUMATOID ARTHRITIS OF MULTIPLE SITES: Primary | ICD-10-CM

## 2024-07-11 DIAGNOSIS — Z79.899 ENCOUNTER FOR LONG-TERM (CURRENT) USE OF OTHER MEDICATIONS: ICD-10-CM

## 2024-07-11 DIAGNOSIS — M15.0 PRIMARY GENERALIZED HYPERTROPHIC OSTEOARTHROSIS: ICD-10-CM

## 2024-07-11 DIAGNOSIS — M05.79 SEROPOSITIVE RHEUMATOID ARTHRITIS OF MULTIPLE SITES: ICD-10-CM

## 2024-07-11 PROCEDURE — 80053 COMPREHEN METABOLIC PANEL: CPT

## 2024-07-11 PROCEDURE — 85007 BL SMEAR W/DIFF WBC COUNT: CPT

## 2024-07-11 PROCEDURE — 86480 TB TEST CELL IMMUN MEASURE: CPT

## 2024-07-11 PROCEDURE — 85025 COMPLETE CBC W/AUTO DIFF WBC: CPT

## 2024-07-11 PROCEDURE — 36415 COLL VENOUS BLD VENIPUNCTURE: CPT

## 2024-07-12 LAB
ALBUMIN SERPL-MCNC: 3.9 G/DL (ref 3.5–5.2)
ALBUMIN/GLOB SERPL: 1.2 G/DL
ALP SERPL-CCNC: 79 U/L (ref 39–117)
ALT SERPL W P-5'-P-CCNC: 66 U/L (ref 1–33)
ANION GAP SERPL CALCULATED.3IONS-SCNC: 10.6 MMOL/L (ref 5–15)
AST SERPL-CCNC: 67 U/L (ref 1–32)
BASOPHILS # BLD MANUAL: 0 10*3/MM3 (ref 0–0.2)
BASOPHILS NFR BLD MANUAL: 0 % (ref 0–1.5)
BILIRUB SERPL-MCNC: 0.4 MG/DL (ref 0–1.2)
BUN SERPL-MCNC: 17 MG/DL (ref 6–20)
BUN/CREAT SERPL: 18.9 (ref 7–25)
CALCIUM SPEC-SCNC: 9.2 MG/DL (ref 8.6–10.5)
CHLORIDE SERPL-SCNC: 103 MMOL/L (ref 98–107)
CO2 SERPL-SCNC: 25.4 MMOL/L (ref 22–29)
CREAT SERPL-MCNC: 0.9 MG/DL (ref 0.57–1)
DEPRECATED RDW RBC AUTO: 43 FL (ref 37–54)
EGFRCR SERPLBLD CKD-EPI 2021: 77.6 ML/MIN/1.73
EOSINOPHIL # BLD MANUAL: 0.13 10*3/MM3 (ref 0–0.4)
EOSINOPHIL NFR BLD MANUAL: 1 % (ref 0.3–6.2)
ERYTHROCYTE [DISTWIDTH] IN BLOOD BY AUTOMATED COUNT: 13.1 % (ref 12.3–15.4)
GLOBULIN UR ELPH-MCNC: 3.2 GM/DL
GLUCOSE SERPL-MCNC: 150 MG/DL (ref 65–99)
HCT VFR BLD AUTO: 41.7 % (ref 34–46.6)
HGB BLD-MCNC: 14.1 G/DL (ref 12–15.9)
LYMPHOCYTES # BLD MANUAL: 6.2 10*3/MM3 (ref 0.7–3.1)
LYMPHOCYTES NFR BLD MANUAL: 9.1 % (ref 5–12)
MCH RBC QN AUTO: 30.7 PG (ref 26.6–33)
MCHC RBC AUTO-ENTMCNC: 33.8 G/DL (ref 31.5–35.7)
MCV RBC AUTO: 90.8 FL (ref 79–97)
MONOCYTES # BLD: 1.22 10*3/MM3 (ref 0.1–0.9)
NEUTROPHILS # BLD AUTO: 5.8 10*3/MM3 (ref 1.7–7)
NEUTROPHILS NFR BLD MANUAL: 43.4 % (ref 42.7–76)
NRBC BLD AUTO-RTO: 0 /100 WBC (ref 0–0.2)
PLAT MORPH BLD: NORMAL
PLATELET # BLD AUTO: 335 10*3/MM3 (ref 140–450)
PMV BLD AUTO: 9.7 FL (ref 6–12)
POTASSIUM SERPL-SCNC: 3.7 MMOL/L (ref 3.5–5.2)
PROT SERPL-MCNC: 7.1 G/DL (ref 6–8.5)
RBC # BLD AUTO: 4.59 10*6/MM3 (ref 3.77–5.28)
RBC MORPH BLD: NORMAL
SODIUM SERPL-SCNC: 139 MMOL/L (ref 136–145)
VARIANT LYMPHS NFR BLD MANUAL: 4 % (ref 0–5)
VARIANT LYMPHS NFR BLD MANUAL: 42.4 % (ref 19.6–45.3)
WBC MORPH BLD: NORMAL
WBC NRBC COR # BLD AUTO: 13.37 10*3/MM3 (ref 3.4–10.8)

## 2024-07-16 LAB
GAMMA INTERFERON BACKGROUND BLD IA-ACNC: 0.04 IU/ML
M TB IFN-G BLD-IMP: NEGATIVE
M TB IFN-G CD4+ BCKGRND COR BLD-ACNC: 0.04 IU/ML
M TB IFN-G CD4+CD8+ BCKGRND COR BLD-ACNC: 0.04 IU/ML
MITOGEN IGNF BCKGRD COR BLD-ACNC: >10 IU/ML
QUANTIFERON INCUBATION: NORMAL
SERVICE CMNT-IMP: NORMAL

## 2025-06-17 ENCOUNTER — TRANSCRIBE ORDERS (OUTPATIENT)
Dept: ADMINISTRATIVE | Facility: HOSPITAL | Age: 55
End: 2025-06-17
Payer: COMMERCIAL

## 2025-06-17 DIAGNOSIS — Z12.31 SCREENING MAMMOGRAM FOR BREAST CANCER: Primary | ICD-10-CM

## 2025-07-11 ENCOUNTER — HOSPITAL ENCOUNTER (OUTPATIENT)
Facility: HOSPITAL | Age: 55
Discharge: HOME | End: 2025-07-11
Payer: COMMERCIAL

## 2025-07-11 ENCOUNTER — TRANSCRIBE ORDERS (OUTPATIENT)
Dept: LAB | Facility: HOSPITAL | Age: 55
End: 2025-07-11
Payer: COMMERCIAL

## 2025-07-11 DIAGNOSIS — Z79.899 ENCOUNTER FOR LONG-TERM (CURRENT) USE OF MEDICATIONS: Primary | ICD-10-CM

## 2025-07-11 DIAGNOSIS — Z79.899 ENCOUNTER FOR LONG-TERM (CURRENT) USE OF OTHER MEDICATIONS: ICD-10-CM

## 2025-07-11 DIAGNOSIS — J84.9 ILD (INTERSTITIAL LUNG DISEASE): ICD-10-CM

## 2025-07-11 PROCEDURE — 80053 COMPREHEN METABOLIC PANEL: CPT | Performed by: NURSE PRACTITIONER

## 2025-07-11 PROCEDURE — 85025 COMPLETE CBC W/AUTO DIFF WBC: CPT | Performed by: NURSE PRACTITIONER

## 2025-07-12 LAB
ALBUMIN SERPL-MCNC: 3.8 G/DL (ref 3.5–5.2)
ALBUMIN/GLOB SERPL: 1 G/DL
ALP SERPL-CCNC: 79 U/L (ref 39–117)
ALT SERPL W P-5'-P-CCNC: 32 U/L (ref 1–33)
ANION GAP SERPL CALCULATED.3IONS-SCNC: 13 MMOL/L (ref 5–15)
AST SERPL-CCNC: 27 U/L (ref 1–32)
BASOPHILS # BLD AUTO: 0.09 10*3/MM3 (ref 0–0.2)
BASOPHILS NFR BLD AUTO: 1 % (ref 0–1.5)
BILIRUB SERPL-MCNC: 0.4 MG/DL (ref 0–1.2)
BUN SERPL-MCNC: 9 MG/DL (ref 6–20)
BUN/CREAT SERPL: 12 (ref 7–25)
CALCIUM SPEC-SCNC: 9.7 MG/DL (ref 8.6–10.5)
CHLORIDE SERPL-SCNC: 104 MMOL/L (ref 98–107)
CO2 SERPL-SCNC: 22 MMOL/L (ref 22–29)
CREAT SERPL-MCNC: 0.75 MG/DL (ref 0.57–1)
DEPRECATED RDW RBC AUTO: 39.6 FL (ref 37–54)
EGFRCR SERPLBLD CKD-EPI 2021: 95.9 ML/MIN/1.73
EOSINOPHIL # BLD AUTO: 0.41 10*3/MM3 (ref 0–0.4)
EOSINOPHIL NFR BLD AUTO: 4.7 % (ref 0.3–6.2)
ERYTHROCYTE [DISTWIDTH] IN BLOOD BY AUTOMATED COUNT: 12 % (ref 12.3–15.4)
GLOBULIN UR ELPH-MCNC: 3.9 GM/DL
GLUCOSE SERPL-MCNC: 94 MG/DL (ref 65–99)
HCT VFR BLD AUTO: 42.3 % (ref 34–46.6)
HGB BLD-MCNC: 13.9 G/DL (ref 12–15.9)
IMM GRANULOCYTES # BLD AUTO: 0.03 10*3/MM3 (ref 0–0.05)
IMM GRANULOCYTES NFR BLD AUTO: 0.3 % (ref 0–0.5)
LYMPHOCYTES # BLD AUTO: 3.48 10*3/MM3 (ref 0.7–3.1)
LYMPHOCYTES NFR BLD AUTO: 40.1 % (ref 19.6–45.3)
MCH RBC QN AUTO: 29.4 PG (ref 26.6–33)
MCHC RBC AUTO-ENTMCNC: 32.9 G/DL (ref 31.5–35.7)
MCV RBC AUTO: 89.4 FL (ref 79–97)
MONOCYTES # BLD AUTO: 0.58 10*3/MM3 (ref 0.1–0.9)
MONOCYTES NFR BLD AUTO: 6.7 % (ref 5–12)
NEUTROPHILS NFR BLD AUTO: 4.08 10*3/MM3 (ref 1.7–7)
NEUTROPHILS NFR BLD AUTO: 47.2 % (ref 42.7–76)
NRBC BLD AUTO-RTO: 0 /100 WBC (ref 0–0.2)
PLATELET # BLD AUTO: 425 10*3/MM3 (ref 140–450)
PMV BLD AUTO: 9 FL (ref 6–12)
POTASSIUM SERPL-SCNC: 3.8 MMOL/L (ref 3.5–5.2)
PROT SERPL-MCNC: 7.7 G/DL (ref 6–8.5)
RBC # BLD AUTO: 4.73 10*6/MM3 (ref 3.77–5.28)
SODIUM SERPL-SCNC: 139 MMOL/L (ref 136–145)
WBC NRBC COR # BLD AUTO: 8.67 10*3/MM3 (ref 3.4–10.8)

## (undated) DEVICE — FRCP BX RADJAW4 NDL 2.8 240CM LG OG BX40

## (undated) DEVICE — Device

## (undated) DEVICE — SINGLE PORT MANIFOLD: Brand: NEPTUNE 2

## (undated) DEVICE — THE BITE BLOCK MAXI, LATEX FREE STRAP IS USED TO PROTECT THE ENDOSCOPE INSERTION TUBE FROM BEING BITTEN BY THE PATIENT.

## (undated) DEVICE — SYR LUERLOK 30CC

## (undated) DEVICE — GOWN,REINF,POLY,ECL,PP SLV,XL: Brand: MEDLINE

## (undated) DEVICE — Device: Brand: DEFENDO AIR/WATER/SUCTION AND BIOPSY VALVE